# Patient Record
Sex: FEMALE | Race: WHITE | NOT HISPANIC OR LATINO | Employment: PART TIME | URBAN - NONMETROPOLITAN AREA
[De-identification: names, ages, dates, MRNs, and addresses within clinical notes are randomized per-mention and may not be internally consistent; named-entity substitution may affect disease eponyms.]

---
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
## 2021-04-15 ENCOUNTER — HOSPITAL ENCOUNTER (EMERGENCY)
Facility: HOSPITAL | Age: 21
Discharge: HOME | End: 2021-04-15
Attending: EMERGENCY MEDICINE | Admitting: EMERGENCY MEDICINE

## 2021-04-15 VITALS
HEIGHT: 61 IN | BODY MASS INDEX: 28.32 KG/M2 | DIASTOLIC BLOOD PRESSURE: 84 MMHG | TEMPERATURE: 97.6 F | WEIGHT: 150 LBS | RESPIRATION RATE: 18 BRPM | HEART RATE: 80 BPM | SYSTOLIC BLOOD PRESSURE: 119 MMHG | OXYGEN SATURATION: 98 %

## 2021-04-15 DIAGNOSIS — R00.2 PALPITATIONS: Primary | ICD-10-CM

## 2021-04-15 LAB
BASE EXCESS BLDA CALC-SCNC: 1 MMOL/L (ref -5–5)
CA-I BLDA-SCNC: 1.31 MMOL/L (ref 1.2–1.32)
CO2 BLDA-SCNC: 27 MMOL/L (ref 24–29)
GLUCOSE BLDC GLUCOMTR-MCNC: 85 MG/DL (ref 70–130)
HCO3 BLDA-SCNC: 26.2 MMOL/L (ref 22–26)
HCT VFR BLDA CALC: 39 % (ref 38–51)
HGB BLDA-MCNC: 13.3 G/DL (ref 12–17)
PCO2 BLDA: 42.2 MM HG (ref 35–45)
PH BLDA: 7.4 PH UNITS (ref 7.35–7.6)
PO2 BLDA: 38 MMHG (ref 80–105)
POTASSIUM BLDA-SCNC: 3.7 MMOL/L (ref 3.5–4.9)
SAO2 % BLDA: 73 % (ref 95–98)
SODIUM BLD-SCNC: 140 MMOL/L (ref 138–146)

## 2021-04-15 PROCEDURE — 82947 ASSAY GLUCOSE BLOOD QUANT: CPT

## 2021-04-15 PROCEDURE — 84132 ASSAY OF SERUM POTASSIUM: CPT

## 2021-04-15 PROCEDURE — 82803 BLOOD GASES ANY COMBINATION: CPT

## 2021-04-15 PROCEDURE — 84295 ASSAY OF SERUM SODIUM: CPT

## 2021-04-15 PROCEDURE — 93005 ELECTROCARDIOGRAM TRACING: CPT | Performed by: EMERGENCY MEDICINE

## 2021-04-15 PROCEDURE — 82330 ASSAY OF CALCIUM: CPT

## 2021-04-15 PROCEDURE — 85014 HEMATOCRIT: CPT

## 2021-04-15 PROCEDURE — 99284 EMERGENCY DEPT VISIT MOD MDM: CPT

## 2021-04-16 NOTE — ED PROVIDER NOTES
EMERGENCY DEPARTMENT ENCOUNTER      Pt Name: Kiersten Mathews  MRN: [id number]  YOB: 2000  Date of evaluation: 4/15/2021  Provider: Jimmy Trejo MD    CHIEF COMPLAINT       Chief Complaint   Patient presents with   • abnormal ekg         HISTORY OF PRESENT ILLNESS  (Location/Symptom, Timing/Onset, Context/Setting, Quality, Duration, Modifying Factors, Severity.)   Kiersten Mathews is a 20 y.o. female who presents to the emergency department for intermittent palpitations for the past 2 days.  Patient states that she has the symptom as if her heart skips a beat that is moderate in severity and without any associated triggering or modifying factors.  She denies any chest pain, shortness of breath, diaphoresis, or significant family history of cardiac disease in association with this.  She was evaluated at an urgent treatment center prior to arrival to the emergency department and was told that her ECG was abnormal and advised to report to the ED.  She denies any chronic medical issues as well as any history of substance abuse.      Nursing notes were reviewed.    REVIEW OF SYSTEMS    (2-9 systems for level 4, 10 or more for level 5)   ROS:  General:  No fevers, no chills, no weakness  Cardiovascular: Palpitations  Respiratory:  No shortness of breath, no cough, no wheezing  Gastrointestinal:  No pain, no nausea, no vomiting, no diarrhea  Musculoskeletal:  No muscle pain, no joint pain  Skin:  No rash, no easy bruising  Neurologic:  No speech problems, no headache, no extremity numbness, no extremity tingling, no extremity weakness  Psychiatric:  No anxiety  Genitourinary:  No dysuria, no hematuria    Except as noted above the remainder of the review of systems was reviewed and negative.       PAST MEDICAL HISTORY   History reviewed. No pertinent past medical history.      SURGICAL HISTORY     History reviewed. No pertinent surgical history.      CURRENT MEDICATIONS     None    ALLERGIES      Penicillins    FAMILY HISTORY       Family History   Problem Relation Age of Onset   • No Known Problems Mother    • No Known Problems Father           SOCIAL HISTORY       Social History     Socioeconomic History   • Marital status: Single     Spouse name: Not on file   • Number of children: Not on file   • Years of education: Not on file   • Highest education level: Not on file   Tobacco Use   • Smoking status: Never Smoker   Substance and Sexual Activity   • Alcohol use: No   • Drug use: No   • Sexual activity: Defer         PHYSICAL EXAM    (up to 7 for level 4, 8 or more for level 5)     Vitals:    04/15/21 2130 04/15/21 2145 04/15/21 2200 04/15/21 2216   BP: 124/83  119/84    BP Location:       Patient Position:       Pulse: 73 72 74 80   Resp:       Temp:       TempSrc:       SpO2: 98% 97% 96% 98%   Weight:       Height:           Physical Exam  General: Awake, alert, no acute distress.  HEENT: Conjunctiva normal.  Neck: Trachea midline.  Cardiac: Heart regular rate, rhythm, no murmurs, rubs, or gallops  Lungs: Lungs are clear to auscultation, there is no wheezing, rhonchi, or rales. There is no use of accessory muscles.  Chest wall: There is no tenderness to palpation over the chest wall or over ribs  Abdomen: Abdomen is soft, nontender, nondistended. There are no firm or pulsatile masses, no rebound rigidity or guarding.   Musculoskeletal: No deformity.  Neuro: Alert and oriented x 4.  Dermatology: Skin is warm and dry  Psych: Mentation is grossly normal, cognition is grossly normal. Affect is appropriate.        DIAGNOSTIC RESULTS     EKG: All EKG's are interpreted by the Emergency Department Physician who either signs or Co-signs this chart in the absence of a cardiologist.    Sinus arrhythmia rate of 60, no acute ST segment or T wave changes, normal intervals, no ectopy, no evidence of WPW/ARVC/HOCM/Brugada  LABS:    I have reviewed and interpreted all of the currently available lab results from this  visit (if applicable):  Results for orders placed or performed during the hospital encounter of 04/15/21   POC Surgery Labs    Specimen: Blood   Result Value Ref Range    Ionized Calcium 1.31 1.20 - 1.32 mmol/L    POC Potassium 3.7 3.5 - 4.9 mmol/L    Sodium 140 138 - 146 mmol/L    Total CO2 27 24 - 29 mmol/L    Hemoglobin 13.3 12.0 - 17.0 g/dL    Hematocrit 39 38 - 51 %    pCO2, Arterial 42.2 35 - 45 mm Hg    pO2, Arterial 38 (L) 80 - 105 mmHg    Base Excess 1.0000 -5 - 5 mmol/L    O2 Saturation, Arterial 73 (L) 95 - 98 %    pH, Arterial 7.40 7.35 - 7.6 pH units    HCO3, Arterial 26.2 (H) 22 - 26 mmol/L    Glucose 85 70 - 130 mg/dL   ECG 12 Lead   Result Value Ref Range    QT Interval 396 ms    QTC Interval 396 ms        All other labs were within normal range or not returned as of this dictation.      EMERGENCY DEPARTMENT COURSE and DIFFERENTIAL DIAGNOSIS/MDM:   Vitals:    Vitals:    04/15/21 2130 04/15/21 2145 04/15/21 2200 04/15/21 2216   BP: 124/83  119/84    BP Location:       Patient Position:       Pulse: 73 72 74 80   Resp:       Temp:       TempSrc:       SpO2: 98% 97% 96% 98%   Weight:       Height:                Patient very well-appearing and asymptomatic throughout the duration of her stay in the emergency department.  Her symptoms are most consistent with most likely PVCs, however there is no evidence of dysrhythmia on cardiac monitoring as well as on the ECG.  There is no evidence of ischemia or syncope syndrome.  Her laboratory evaluation demonstrates no evidence of electrolyte derangement or significant anemia.  I will place the patient in cardiac monitoring follow-up and also have her follow-up with her primary care provider.    I had a discussion with the patient/family regarding diagnosis, diagnostic results, treatment plan, and medications.  The patient/family indicated understanding of these instructions.  I spent adequate time at the bedside preceding discharge necessary to personally  discuss the aftercare instructions, giving patient education, providing explanations of the results of our evaluations/findings, and my decision making to assure that the patient/family understand the plan of care.  Time was allotted to answer questions at that time and throughout the ED course.  Emphasis was placed on timely follow-up after discharge.  I also discussed the potential for the development of an acute emergent condition requiring further evaluation, admission, or even surgical intervention. I discussed that we found nothing during the visit today indicating the need for further workup, admission, or the presence of an unstable medical condition.  I encouraged the patient to return to the emergency department immediately for ANY concerns, worsening, new complaints, or if symptoms persist and unable to seek follow-up in a timely fashion.  The patient/family expressed understanding and agreement with this plan.  The patient will follow-up with their PCP in 1-2 days for reevaluation.         FINAL IMPRESSION      1. Palpitations          DISPOSITION/PLAN     ED Disposition     ED Disposition Condition Comment    Discharge Stable           PATIENT REFERRED TO:  Porsche Santos, DINA  [address]  [facility] [address]  [phone]    Schedule an appointment as soon as possible for a visit in 2 days      [facility] Emergency Department  [address] [address] [phone]    If symptoms worsen    [facility] CARDIOLOGY  [address]  [facility] [address] [phone]  Schedule an appointment as soon as possible for a visit in 2 days        DISCHARGE MEDICATIONS:     Medication List      You have not been prescribed any medications.             Comment: Please note this report has been produced using speech recognition software.      Jimmy Trejo MD  Attending Emergency Physician               Neil,  Jimmy RICH MD  04/17/21 025

## 2021-04-17 LAB
QT INTERVAL: 396 MS
QTC INTERVAL: 396 MS

## 2021-04-22 ENCOUNTER — OFFICE VISIT (OUTPATIENT)
Dept: CARDIOLOGY | Facility: HOSPITAL | Age: 21
End: 2021-04-22

## 2021-04-22 ENCOUNTER — HOSPITAL ENCOUNTER (OUTPATIENT)
Dept: CARDIOLOGY | Facility: HOSPITAL | Age: 21
Discharge: HOME | End: 2021-04-22

## 2021-04-22 VITALS
DIASTOLIC BLOOD PRESSURE: 80 MMHG | HEIGHT: 61 IN | TEMPERATURE: 98 F | HEART RATE: 78 BPM | BODY MASS INDEX: 29.72 KG/M2 | SYSTOLIC BLOOD PRESSURE: 126 MMHG | WEIGHT: 157.44 LBS | RESPIRATION RATE: 18 BRPM | OXYGEN SATURATION: 98 %

## 2021-04-22 DIAGNOSIS — R00.2 PALPITATIONS: Primary | ICD-10-CM

## 2021-04-22 PROCEDURE — 93246 EXT ECG>7D<15D RECORDING: CPT

## 2021-04-22 PROCEDURE — 99203 OFFICE O/P NEW LOW 30 MIN: CPT | Performed by: NURSE PRACTITIONER

## 2021-04-22 NOTE — PROGRESS NOTES
"Chief Complaint  Establish Care and Palpitations    Subjective    History of Present Illness {CC  Problem List  Visit  Diagnosis   Encounters  Notes  Medications  Labs  Result Review Imaging  Media :23}     Kiersten Mathews presents to [facility] CARDIOLOGY for   MsNadia Mathews comes into the heart valve center at the request of the ER physician, Dr. Trejo.  She presented there on 4/15/2021 with a 2-day history of intermittent palpitations.  In urgent treatment center checked an EKG and advised her to go to ER.    Patient states palpitations have improved and nearly resolved since ER visit.  When palpitations began she noted her blood pressure was very high for couple of days.  It is trending downward now.  Currently her Apple Watch is keeping track of her heart rate numbers.  Resting heart rate has been as high as 100.  She continues to exercise regularly.    Patient states her palpitation and high blood pressure symptoms may have been related to situational stress.  She works as a .  Patient states she is a non-smoker.  She does not drink caffeine.  She exercises 3 to 5 days/week.  She does not use decongestants or recreational drugs.  She denies any recent work shift changes.    Family medical history: Father has hypertension maternal grandmother hypertension.  No CAD, no dysrhythmias, no sudden cardiac death family history           Objective     Vital Signs:   Vitals:    04/22/21 1110 04/22/21 1114 04/22/21 1115   BP: 132/88 125/85 126/80   BP Location: Right arm Left arm Left arm   Patient Position: Sitting Standing Sitting   Cuff Size: Adult Adult Adult   Pulse: 73 81 78   Resp:   18   Temp:   98 °F (36.7 °C)   TempSrc:   Temporal   SpO2: 100% 98% 98%   Weight:   71.4 kg (157 lb 7 oz)   Height:   154.9 cm (61\")     Body mass index is 29.75 kg/m².  Physical Exam  Vitals reviewed.   Constitutional:       General: She is not in acute distress.     Appearance: Normal " appearance.   HENT:      Head: Normocephalic and atraumatic.   Eyes:      Conjunctiva/sclera: Conjunctivae normal.      Pupils: Pupils are equal, round, and reactive to light.   Neck:      Vascular: No carotid bruit.   Cardiovascular:      Rate and Rhythm: Normal rate and regular rhythm.      Pulses: Normal pulses.      Heart sounds: No murmur heard.     Pulmonary:      Effort: Pulmonary effort is normal. No respiratory distress.      Breath sounds: Normal breath sounds.   Musculoskeletal:         General: Normal range of motion.      Cervical back: Neck supple.      Right lower leg: No edema.      Left lower leg: No edema.   Lymphadenopathy:      Cervical: No cervical adenopathy.   Skin:     General: Skin is warm and dry.      Capillary Refill: Capillary refill takes less than 2 seconds.   Neurological:      General: No focal deficit present.      Mental Status: She is oriented to person, place, and time.   Psychiatric:         Mood and Affect: Mood normal.         Behavior: Behavior normal.        Result Review  Data Reviewed:{ Labs  Result Review  Imaging  Med Tab  Media :23}     Recent hospitalization notes ER notes, labs, EKG            Assessment and Plan {CC Problem List  Visit Diagnosis  ROS  Review (Popup)  Health Maintenance  Quality  BestPractice  Medications  SmartSets  SnapShot Encounters  Media :23}   1. Palpitations  -Improving  - Holter Monitor -14 days days  - Follow-up in approximate 3 weeks  -Reported symptoms worsen or change in the interim      21 minutes    Follow Up {Instructions Charge Capture  Follow-up Communications :23}   Return in about 3 weeks (around 5/13/2021) for Monitor/ testing follow up.    Patient was given instructions and counseling regarding her condition or for health maintenance advice. Please see specific information pulled into the AVS if appropriate.  Patient was instructed to call the [facility] with any questions, concerns, or worsening  symptoms.    *Please note that portions of this note were completed with a voice recognition program. Efforts were made to edit the dictations, but occasionally words are mistranscribed.

## 2021-04-22 NOTE — PROGRESS NOTES
[facility] Heart Monitor Documentation    Kiersten Mathews  2000  [id number]  04/22/21    DINA Sims    [] ZIO XT Patch  Model [id number] Prescribed for N/A Days    · Serial Number: (N + 9 Digits) N   · Apply-By Date on Box:   · USPS Tracking Number:   · USPS Tracking        [] [employer]ian MINI PLUS Mobile Cardiac Telemetry  Model BGMINIPLUS Prescribed for N/A Days    · Serial Number: (BGM + 7 Digits) BGM  · Shipped-By Date on Box:   · UPS Tracking Number: 1Z  · UPS Tracking      [] [employer]ian MINI Holter Monitor  Model BGMINIEL Prescribed for 14 Days    · Serial Number: (7 Digits) [id number]  · Shipped-By Date on Box: [id number]  · UPS Tracking Number: [id number]  · UPS Tracking        This monitor was applied to the patient's chest and checked for proper functioning.  Ms. Kiersten Mahtews was instructed in the proper use of this monitor.  She was given the opportunity to ask questions and left the office with the device 's instruction manual.    Erika Whelan MA, 11:45 EDT, 04/22/21                  [facility] 8[facility]

## 2021-05-13 ENCOUNTER — OFFICE VISIT (OUTPATIENT)
Dept: CARDIOLOGY | Facility: HOSPITAL | Age: 21
End: 2021-05-13

## 2021-05-13 VITALS
SYSTOLIC BLOOD PRESSURE: 128 MMHG | HEIGHT: 61 IN | HEART RATE: 72 BPM | WEIGHT: 150 LBS | RESPIRATION RATE: 18 BRPM | OXYGEN SATURATION: 98 % | BODY MASS INDEX: 28.32 KG/M2 | TEMPERATURE: 98.4 F | DIASTOLIC BLOOD PRESSURE: 80 MMHG

## 2021-05-13 DIAGNOSIS — R07.2 PRECORDIAL PAIN: ICD-10-CM

## 2021-05-13 DIAGNOSIS — R00.2 PALPITATIONS: Primary | ICD-10-CM

## 2021-05-13 PROCEDURE — 99214 OFFICE O/P EST MOD 30 MIN: CPT | Performed by: NURSE PRACTITIONER

## 2021-05-13 NOTE — PROGRESS NOTES
"Chief Complaint  Follow-up (palpitations)    Subjective    History of Present Illness {CC  Problem List  Visit  Diagnosis   Encounters  Notes  Medications  Labs  Result Review Imaging  Media :23}     Kiersten Mathews presents to [facility] CARDIOLOGY for   Patient follows up to review cardiac rhythm monitor that was placed on 4/22/21.    Patient states her HR has not had the ups and downs like it did before the monitor was placed.  However, she has noticed substernal chest pain on three occasions while the monitor was in place.  Patient is s/p COVID illness in January.  She is concerned current cardiac symptoms are related to that process.       Objective     Vital Signs:   Vitals:    05/13/21 1006   BP: 128/80   BP Location: Left arm   Patient Position: Sitting   Cuff Size: Adult   Pulse: 72   Resp: 18   Temp: 98.4 °F (36.9 °C)   TempSrc: Temporal   SpO2: 98%   Weight: 68 kg (150 lb)   Height: 154.9 cm (61\")     Body mass index is 28.34 kg/m².  Physical Exam  Vitals reviewed.   Constitutional:       General: She is not in acute distress.     Appearance: Normal appearance.   HENT:      Head: Normocephalic and atraumatic.   Eyes:      Conjunctiva/sclera: Conjunctivae normal.      Pupils: Pupils are equal, round, and reactive to light.   Cardiovascular:      Rate and Rhythm: Normal rate and regular rhythm.      Pulses: Normal pulses.      Heart sounds: Normal heart sounds. No murmur heard.     Pulmonary:      Effort: Pulmonary effort is normal. No respiratory distress.      Breath sounds: Normal breath sounds. No wheezing or rales.   Chest:      Chest wall: No tenderness.   Musculoskeletal:         General: Normal range of motion.      Cervical back: Normal range of motion and neck supple.      Right lower leg: No edema.      Left lower leg: No edema.   Skin:     General: Skin is warm and dry.      Capillary Refill: Capillary refill takes less than 2 seconds.   Neurological:      General: No " focal deficit present.      Mental Status: She is alert and oriented to person, place, and time.   Psychiatric:         Mood and Affect: Mood normal.         Behavior: Behavior normal.              Result Review  Data Reviewed:{ Labs  Result Review  Imaging  Med Tab  Media :23}     Preliminary data from 14 day holter: benign.  Sinus arrhythmia, sinus tachycardia.  No afib, no SVT, no pauses.              Assessment and Plan {CC Problem List  Visit Diagnosis  ROS  Review (Popup)  Health Maintenance  Quality  BestPractice  Medications  SmartSets  SnapShot Encounters  Media :23}   1. Palpitations  - Benign monitor study    2. Precordial pain  - No CAD risk factors  - s/p COVID 19 infection January 2021  - Treadmill Stress Test; Future  - Adult Transthoracic Echo Complete W/ Cont if Necessary Per Protocol; Future      29 minutes    Follow Up {Instructions Charge Capture  Follow-up Communications :23}   Return in about 3 weeks (around 6/3/2021) for Video visit, Monitor/ testing follow up.    Patient was given instructions and counseling regarding her condition or for health maintenance advice. Please see specific information pulled into the AVS if appropriate.  Patient was instructed to call the [facility] with any questions, concerns, or worsening symptoms.

## 2021-05-25 PROCEDURE — 93248 EXT ECG>7D<15D REV&INTERPJ: CPT | Performed by: INTERNAL MEDICINE

## 2021-06-30 ENCOUNTER — HOSPITAL ENCOUNTER (OUTPATIENT)
Dept: CARDIOLOGY | Facility: HOSPITAL | Age: 21
Discharge: HOME | End: 2021-06-30

## 2021-06-30 VITALS — HEIGHT: 61 IN | WEIGHT: 150 LBS | BODY MASS INDEX: 28.32 KG/M2

## 2021-06-30 VITALS
BODY MASS INDEX: 28.32 KG/M2 | SYSTOLIC BLOOD PRESSURE: 120 MMHG | HEIGHT: 61 IN | WEIGHT: 150 LBS | DIASTOLIC BLOOD PRESSURE: 90 MMHG | HEART RATE: 78 BPM

## 2021-06-30 DIAGNOSIS — R00.2 PALPITATIONS: ICD-10-CM

## 2021-06-30 DIAGNOSIS — R07.2 PRECORDIAL PAIN: ICD-10-CM

## 2021-06-30 LAB
BH CV ECHO MEAS - AO ROOT AREA (BSA CORRECTED): 1.8
BH CV ECHO MEAS - AO ROOT AREA: 7 CM^2
BH CV ECHO MEAS - AO ROOT DIAM: 3 CM
BH CV ECHO MEAS - ASC AORTA: 2.4 CM
BH CV ECHO MEAS - BSA(HAYCOCK): 1.7 M^2
BH CV ECHO MEAS - BSA: 1.7 M^2
BH CV ECHO MEAS - BZI_BMI: 28.7 KILOGRAMS/M^2
BH CV ECHO MEAS - BZI_METRIC_HEIGHT: 154 CM
BH CV ECHO MEAS - BZI_METRIC_WEIGHT: 68 KG
BH CV ECHO MEAS - EDV(CUBED): 104.5 ML
BH CV ECHO MEAS - EDV(MOD-SP2): 125 ML
BH CV ECHO MEAS - EDV(MOD-SP4): 124 ML
BH CV ECHO MEAS - EDV(TEICH): 102.9 ML
BH CV ECHO MEAS - EF(CUBED): 75.4 %
BH CV ECHO MEAS - EF(MOD-BP): 55 %
BH CV ECHO MEAS - EF(MOD-SP2): 59 %
BH CV ECHO MEAS - EF(MOD-SP4): 51.2 %
BH CV ECHO MEAS - EF(TEICH): 67.3 %
BH CV ECHO MEAS - ESV(CUBED): 25.7 ML
BH CV ECHO MEAS - ESV(MOD-SP2): 51.2 ML
BH CV ECHO MEAS - ESV(MOD-SP4): 60.5 ML
BH CV ECHO MEAS - ESV(TEICH): 33.6 ML
BH CV ECHO MEAS - FS: 37.4 %
BH CV ECHO MEAS - IVS/LVPW: 0.82
BH CV ECHO MEAS - IVSD: 0.88 CM
BH CV ECHO MEAS - LA DIMENSION: 2.9 CM
BH CV ECHO MEAS - LA/AO: 0.98
BH CV ECHO MEAS - LAD MAJOR: 3.7 CM
BH CV ECHO MEAS - LAT PEAK E' VEL: 15.3 CM/SEC
BH CV ECHO MEAS - LATERAL E/E' RATIO: 4.3
BH CV ECHO MEAS - LV DIASTOLIC VOL/BSA (35-75): 74.5 ML/M^2
BH CV ECHO MEAS - LV MASS(C)D: 158.5 GRAMS
BH CV ECHO MEAS - LV MASS(C)DI: 95.2 GRAMS/M^2
BH CV ECHO MEAS - LV MAX PG: 3.8 MMHG
BH CV ECHO MEAS - LV MEAN PG: 2 MMHG
BH CV ECHO MEAS - LV SYSTOLIC VOL/BSA (12-30): 36.4 ML/M^2
BH CV ECHO MEAS - LV V1 MAX: 97.3 CM/SEC
BH CV ECHO MEAS - LV V1 MEAN: 65.6 CM/SEC
BH CV ECHO MEAS - LV V1 VTI: 21.3 CM
BH CV ECHO MEAS - LVIDD: 4.7 CM
BH CV ECHO MEAS - LVIDS: 2.9 CM
BH CV ECHO MEAS - LVLD AP2: 7.9 CM
BH CV ECHO MEAS - LVLD AP4: 8 CM
BH CV ECHO MEAS - LVLS AP2: 7 CM
BH CV ECHO MEAS - LVLS AP4: 6.9 CM
BH CV ECHO MEAS - LVOT AREA (M): 2.5 CM^2
BH CV ECHO MEAS - LVOT AREA: 2.5 CM^2
BH CV ECHO MEAS - LVOT DIAM: 1.8 CM
BH CV ECHO MEAS - LVPWD: 1.1 CM
BH CV ECHO MEAS - MED PEAK E' VEL: 10 CM/SEC
BH CV ECHO MEAS - MEDIAL E/E' RATIO: 6.6
BH CV ECHO MEAS - MV A MAX VEL: 45.4 CM/SEC
BH CV ECHO MEAS - MV DEC SLOPE: 356.8 CM/SEC^2
BH CV ECHO MEAS - MV DEC TIME: 0.14 SEC
BH CV ECHO MEAS - MV E MAX VEL: 66.4 CM/SEC
BH CV ECHO MEAS - MV E/A: 1.5
BH CV ECHO MEAS - MV MAX PG: 2.2 MMHG
BH CV ECHO MEAS - MV MEAN PG: 1.1 MMHG
BH CV ECHO MEAS - MV P1/2T MAX VEL: 71.7 CM/SEC
BH CV ECHO MEAS - MV P1/2T: 58.8 MSEC
BH CV ECHO MEAS - MV V2 MAX: 73.8 CM/SEC
BH CV ECHO MEAS - MV V2 MEAN: 50.1 CM/SEC
BH CV ECHO MEAS - MV V2 VTI: 20.1 CM
BH CV ECHO MEAS - MVA P1/2T LCG: 3.1 CM^2
BH CV ECHO MEAS - MVA(P1/2T): 3.7 CM^2
BH CV ECHO MEAS - MVA(VTI): 2.6 CM^2
BH CV ECHO MEAS - PA ACC TIME: 0.07 SEC
BH CV ECHO MEAS - PA PR(ACCEL): 45.7 MMHG
BH CV ECHO MEAS - SI(CUBED): 47.3 ML/M^2
BH CV ECHO MEAS - SI(LVOT): 31.3 ML/M^2
BH CV ECHO MEAS - SI(MOD-SP2): 44.3 ML/M^2
BH CV ECHO MEAS - SI(MOD-SP4): 38.2 ML/M^2
BH CV ECHO MEAS - SI(TEICH): 41.6 ML/M^2
BH CV ECHO MEAS - SV(CUBED): 78.8 ML
BH CV ECHO MEAS - SV(LVOT): 52.1 ML
BH CV ECHO MEAS - SV(MOD-SP2): 73.8 ML
BH CV ECHO MEAS - SV(MOD-SP4): 63.5 ML
BH CV ECHO MEAS - SV(TEICH): 69.3 ML
BH CV ECHO MEAS - TAPSE (>1.6): 1.7 CM
BH CV ECHO MEASUREMENTS AVERAGE E/E' RATIO: 5.25
BH CV STRESS BP STAGE 1: NORMAL
BH CV STRESS BP STAGE 2: NORMAL
BH CV STRESS BP STAGE 3: NORMAL
BH CV STRESS BP STAGE 4: NORMAL
BH CV STRESS DURATION MIN STAGE 1: 3
BH CV STRESS DURATION MIN STAGE 2: 3
BH CV STRESS DURATION MIN STAGE 3: 3
BH CV STRESS DURATION MIN STAGE 4: 2
BH CV STRESS DURATION SEC STAGE 1: 0
BH CV STRESS DURATION SEC STAGE 2: 0
BH CV STRESS DURATION SEC STAGE 3: 0
BH CV STRESS DURATION SEC STAGE 4: 45
BH CV STRESS GRADE STAGE 1: 10
BH CV STRESS GRADE STAGE 2: 12
BH CV STRESS GRADE STAGE 3: 14
BH CV STRESS GRADE STAGE 4: 16
BH CV STRESS HR STAGE 1: 113
BH CV STRESS HR STAGE 2: 133
BH CV STRESS HR STAGE 3: 150
BH CV STRESS HR STAGE 4: 179
BH CV STRESS METS STAGE 1: 5
BH CV STRESS METS STAGE 2: 7.5
BH CV STRESS METS STAGE 3: 10
BH CV STRESS METS STAGE 4: 13.5
BH CV STRESS O2 STAGE 1: 97
BH CV STRESS O2 STAGE 2: 97
BH CV STRESS O2 STAGE 3: 98
BH CV STRESS O2 STAGE 4: 99
BH CV STRESS PROTOCOL 1: NORMAL
BH CV STRESS RECOVERY BP: NORMAL MMHG
BH CV STRESS RECOVERY HR: 102 BPM
BH CV STRESS RECOVERY O2: 97 %
BH CV STRESS SPEED STAGE 1: 1.7
BH CV STRESS SPEED STAGE 2: 2.5
BH CV STRESS SPEED STAGE 3: 3.4
BH CV STRESS SPEED STAGE 4: 4.2
BH CV STRESS STAGE 1: 1
BH CV STRESS STAGE 2: 2
BH CV STRESS STAGE 3: 3
BH CV STRESS STAGE 4: 4
BH CV VAS BP LEFT ARM: NORMAL MMHG
BH CV XLRA - RV BASE: 3.9 CM
BH CV XLRA - RV LENGTH: 4.8 CM
BH CV XLRA - RV MID: 2.8 CM
BH CV XLRA - TDI S': 12 CM/SEC
LEFT ATRIUM VOLUME INDEX: 15.7 ML/M^2
LEFT ATRIUM VOLUME: 26.1 ML
LV EF 2D ECHO EST: 55 %
MAXIMAL PREDICTED HEART RATE: 198 BPM
MAXIMAL PREDICTED HEART RATE: 198 BPM
PERCENT MAX PREDICTED HR: 90.4 %
STRESS BASELINE BP: NORMAL MMHG
STRESS BASELINE HR: 78 BPM
STRESS O2 SAT REST: 97 %
STRESS PERCENT HR: 106 %
STRESS POST ESTIMATED WORKLOAD: 13.4 METS
STRESS POST EXERCISE DUR MIN: 11 MIN
STRESS POST EXERCISE DUR SEC: 45 SEC
STRESS POST O2 SAT PEAK: 99 %
STRESS POST PEAK BP: NORMAL MMHG
STRESS POST PEAK HR: 179 BPM
STRESS TARGET HR: 168 BPM
STRESS TARGET HR: 168 BPM

## 2021-06-30 PROCEDURE — 93306 TTE W/DOPPLER COMPLETE: CPT

## 2021-06-30 PROCEDURE — 93306 TTE W/DOPPLER COMPLETE: CPT | Performed by: INTERNAL MEDICINE

## 2021-06-30 PROCEDURE — 93018 CV STRESS TEST I&R ONLY: CPT | Performed by: INTERNAL MEDICINE

## 2021-06-30 PROCEDURE — 93017 CV STRESS TEST TRACING ONLY: CPT

## 2022-04-11 ENCOUNTER — HOSPITAL ENCOUNTER (EMERGENCY)
Facility: HOSPITAL | Age: 22
Discharge: HOME | End: 2022-04-12
Attending: STUDENT IN AN ORGANIZED HEALTH CARE EDUCATION/TRAINING PROGRAM | Admitting: STUDENT IN AN ORGANIZED HEALTH CARE EDUCATION/TRAINING PROGRAM

## 2022-04-11 ENCOUNTER — APPOINTMENT (OUTPATIENT)
Dept: CT IMAGING | Facility: HOSPITAL | Age: 22
End: 2022-04-11

## 2022-04-11 DIAGNOSIS — M62.830 SPASM OF MUSCLE, BACK: ICD-10-CM

## 2022-04-11 DIAGNOSIS — N39.0 ACUTE UTI (URINARY TRACT INFECTION): Primary | ICD-10-CM

## 2022-04-11 LAB
ALBUMIN SERPL-MCNC: 4.2 G/DL (ref 3.5–5.2)
ALBUMIN/GLOB SERPL: 1.3 G/DL
ALP SERPL-CCNC: 79 U/L (ref 39–117)
ALT SERPL W P-5'-P-CCNC: 9 U/L (ref 1–33)
ANION GAP SERPL CALCULATED.3IONS-SCNC: 10 MMOL/L (ref 5–15)
AST SERPL-CCNC: 16 U/L (ref 1–32)
BACTERIA UR QL AUTO: ABNORMAL /HPF
BASOPHILS # BLD AUTO: 0.03 10*3/MM3 (ref 0–0.2)
BASOPHILS NFR BLD AUTO: 0.3 % (ref 0–1.5)
BILIRUB SERPL-MCNC: 0.2 MG/DL (ref 0–1.2)
BILIRUB UR QL STRIP: NEGATIVE
BUN SERPL-MCNC: 25 MG/DL (ref 6–20)
BUN/CREAT SERPL: 28.7 (ref 7–25)
CALCIUM SPEC-SCNC: 9.5 MG/DL (ref 8.6–10.5)
CHLORIDE SERPL-SCNC: 103 MMOL/L (ref 98–107)
CLARITY UR: ABNORMAL
CO2 SERPL-SCNC: 26 MMOL/L (ref 22–29)
COLOR UR: YELLOW
CREAT SERPL-MCNC: 0.87 MG/DL (ref 0.57–1)
DEPRECATED RDW RBC AUTO: 45.1 FL (ref 37–54)
EGFRCR SERPLBLD CKD-EPI 2021: 96.7 ML/MIN/1.73
EOSINOPHIL # BLD AUTO: 0.26 10*3/MM3 (ref 0–0.4)
EOSINOPHIL NFR BLD AUTO: 2.5 % (ref 0.3–6.2)
ERYTHROCYTE [DISTWIDTH] IN BLOOD BY AUTOMATED COUNT: 13.2 % (ref 12.3–15.4)
GLOBULIN UR ELPH-MCNC: 3.3 GM/DL
GLUCOSE SERPL-MCNC: 85 MG/DL (ref 65–99)
GLUCOSE UR STRIP-MCNC: NEGATIVE MG/DL
HCT VFR BLD AUTO: 35.9 % (ref 34–46.6)
HGB BLD-MCNC: 11.9 G/DL (ref 12–15.9)
HGB UR QL STRIP.AUTO: NEGATIVE
HYALINE CASTS UR QL AUTO: ABNORMAL /LPF
IMM GRANULOCYTES # BLD AUTO: 0.02 10*3/MM3 (ref 0–0.05)
IMM GRANULOCYTES NFR BLD AUTO: 0.2 % (ref 0–0.5)
KETONES UR QL STRIP: ABNORMAL
LEUKOCYTE ESTERASE UR QL STRIP.AUTO: NEGATIVE
LIPASE SERPL-CCNC: 29 U/L (ref 13–60)
LYMPHOCYTES # BLD AUTO: 2.99 10*3/MM3 (ref 0.7–3.1)
LYMPHOCYTES NFR BLD AUTO: 28.8 % (ref 19.6–45.3)
MCH RBC QN AUTO: 30.7 PG (ref 26.6–33)
MCHC RBC AUTO-ENTMCNC: 33.1 G/DL (ref 31.5–35.7)
MCV RBC AUTO: 92.8 FL (ref 79–97)
MONOCYTES # BLD AUTO: 0.84 10*3/MM3 (ref 0.1–0.9)
MONOCYTES NFR BLD AUTO: 8.1 % (ref 5–12)
NEUTROPHILS NFR BLD AUTO: 6.23 10*3/MM3 (ref 1.7–7)
NEUTROPHILS NFR BLD AUTO: 60.1 % (ref 42.7–76)
NITRITE UR QL STRIP: NEGATIVE
NRBC BLD AUTO-RTO: 0 /100 WBC (ref 0–0.2)
PH UR STRIP.AUTO: 5.5 [PH] (ref 5–8)
PLATELET # BLD AUTO: 313 10*3/MM3 (ref 140–450)
PMV BLD AUTO: 10.5 FL (ref 6–12)
POTASSIUM SERPL-SCNC: 4 MMOL/L (ref 3.5–5.2)
PROT SERPL-MCNC: 7.5 G/DL (ref 6–8.5)
PROT UR QL STRIP: NEGATIVE
RBC # BLD AUTO: 3.87 10*6/MM3 (ref 3.77–5.28)
RBC # UR STRIP: ABNORMAL /HPF
REF LAB TEST METHOD: ABNORMAL
SODIUM SERPL-SCNC: 139 MMOL/L (ref 136–145)
SP GR UR STRIP: 1.04 (ref 1–1.03)
SQUAMOUS #/AREA URNS HPF: ABNORMAL /HPF
UROBILINOGEN UR QL STRIP: ABNORMAL
WBC # UR STRIP: ABNORMAL /HPF
WBC NRBC COR # BLD: 10.37 10*3/MM3 (ref 3.4–10.8)

## 2022-04-11 PROCEDURE — 81001 URINALYSIS AUTO W/SCOPE: CPT | Performed by: STUDENT IN AN ORGANIZED HEALTH CARE EDUCATION/TRAINING PROGRAM

## 2022-04-11 PROCEDURE — 93005 ELECTROCARDIOGRAM TRACING: CPT

## 2022-04-11 PROCEDURE — 71250 CT THORAX DX C-: CPT

## 2022-04-11 PROCEDURE — 99283 EMERGENCY DEPT VISIT LOW MDM: CPT

## 2022-04-11 PROCEDURE — 80053 COMPREHEN METABOLIC PANEL: CPT | Performed by: PHYSICIAN ASSISTANT

## 2022-04-11 PROCEDURE — 93005 ELECTROCARDIOGRAM TRACING: CPT | Performed by: STUDENT IN AN ORGANIZED HEALTH CARE EDUCATION/TRAINING PROGRAM

## 2022-04-11 PROCEDURE — 83690 ASSAY OF LIPASE: CPT | Performed by: PHYSICIAN ASSISTANT

## 2022-04-11 PROCEDURE — 85025 COMPLETE CBC W/AUTO DIFF WBC: CPT | Performed by: PHYSICIAN ASSISTANT

## 2022-04-11 RX ORDER — DIAZEPAM 5 MG/ML
5 INJECTION, SOLUTION INTRAMUSCULAR; INTRAVENOUS ONCE
Status: COMPLETED | OUTPATIENT
Start: 2022-04-11 | End: 2022-04-12

## 2022-04-12 VITALS
TEMPERATURE: 98 F | RESPIRATION RATE: 18 BRPM | WEIGHT: 150 LBS | HEART RATE: 81 BPM | BODY MASS INDEX: 28.32 KG/M2 | DIASTOLIC BLOOD PRESSURE: 86 MMHG | OXYGEN SATURATION: 97 % | SYSTOLIC BLOOD PRESSURE: 129 MMHG | HEIGHT: 61 IN

## 2022-04-12 LAB
QT INTERVAL: 366 MS
QTC INTERVAL: 419 MS

## 2022-04-12 PROCEDURE — 96374 THER/PROPH/DIAG INJ IV PUSH: CPT

## 2022-04-12 PROCEDURE — 25010000002 DIAZEPAM PER 5 MG: Performed by: STUDENT IN AN ORGANIZED HEALTH CARE EDUCATION/TRAINING PROGRAM

## 2022-04-12 RX ORDER — TIZANIDINE 2 MG/1
2 TABLET ORAL EVERY 6 HOURS PRN
Qty: 12 TABLET | Refills: 0 | Status: SHIPPED | OUTPATIENT
Start: 2022-04-12 | End: 2022-04-15

## 2022-04-12 RX ORDER — NITROFURANTOIN 25; 75 MG/1; MG/1
100 CAPSULE ORAL 2 TIMES DAILY
Qty: 10 CAPSULE | Refills: 0 | Status: SHIPPED | OUTPATIENT
Start: 2022-04-12 | End: 2022-04-14 | Stop reason: HOSPADM

## 2022-04-12 RX ADMIN — DIAZEPAM 5 MG: 5 INJECTION, SOLUTION INTRAMUSCULAR; INTRAVENOUS at 00:13

## 2022-04-13 ENCOUNTER — HOSPITAL ENCOUNTER (OUTPATIENT)
Facility: HOSPITAL | Age: 22
Discharge: HOME | End: 2022-04-14
Attending: STUDENT IN AN ORGANIZED HEALTH CARE EDUCATION/TRAINING PROGRAM | Admitting: NEUROLOGICAL SURGERY

## 2022-04-13 ENCOUNTER — APPOINTMENT (OUTPATIENT)
Dept: MRI IMAGING | Facility: HOSPITAL | Age: 22
End: 2022-04-13

## 2022-04-13 ENCOUNTER — APPOINTMENT (OUTPATIENT)
Dept: GENERAL RADIOLOGY | Facility: HOSPITAL | Age: 22
End: 2022-04-13

## 2022-04-13 ENCOUNTER — ANESTHESIA (OUTPATIENT)
Dept: PERIOP | Facility: HOSPITAL | Age: 22
End: 2022-04-13

## 2022-04-13 ENCOUNTER — ANESTHESIA EVENT (OUTPATIENT)
Dept: PERIOP | Facility: HOSPITAL | Age: 22
End: 2022-04-13

## 2022-04-13 DIAGNOSIS — G83.11 PARALYSIS OF RIGHT LOWER EXTREMITY: ICD-10-CM

## 2022-04-13 DIAGNOSIS — G83.14 PARALYSIS OF LEFT LOWER EXTREMITY: ICD-10-CM

## 2022-04-13 DIAGNOSIS — R07.2 PRECORDIAL PAIN: ICD-10-CM

## 2022-04-13 DIAGNOSIS — M54.50 ACUTE LEFT-SIDED LOW BACK PAIN, UNSPECIFIED WHETHER SCIATICA PRESENT: Primary | ICD-10-CM

## 2022-04-13 DIAGNOSIS — M54.6 ACUTE RIGHT-SIDED THORACIC BACK PAIN: ICD-10-CM

## 2022-04-13 PROBLEM — D72.829 LEUKOCYTOSIS: Status: ACTIVE | Noted: 2022-04-13

## 2022-04-13 PROBLEM — E03.9 HYPOTHYROIDISM (ACQUIRED): Status: ACTIVE | Noted: 2022-04-13

## 2022-04-13 PROBLEM — I10 ESSENTIAL HYPERTENSION: Status: ACTIVE | Noted: 2022-04-13

## 2022-04-13 LAB
ALBUMIN SERPL-MCNC: 4.4 G/DL (ref 3.5–5.2)
ALBUMIN/GLOB SERPL: 1.3 G/DL
ALP SERPL-CCNC: 74 U/L (ref 39–117)
ALT SERPL W P-5'-P-CCNC: 9 U/L (ref 1–33)
ANION GAP SERPL CALCULATED.3IONS-SCNC: 13 MMOL/L (ref 5–15)
AST SERPL-CCNC: 17 U/L (ref 1–32)
B-HCG UR QL: NEGATIVE
BASOPHILS # BLD AUTO: 0.03 10*3/MM3 (ref 0–0.2)
BASOPHILS NFR BLD AUTO: 0.2 % (ref 0–1.5)
BILIRUB SERPL-MCNC: 0.4 MG/DL (ref 0–1.2)
BILIRUB UR QL STRIP: NEGATIVE
BUN SERPL-MCNC: 15 MG/DL (ref 6–20)
BUN/CREAT SERPL: 20 (ref 7–25)
CALCIUM SPEC-SCNC: 9.1 MG/DL (ref 8.6–10.5)
CHLORIDE SERPL-SCNC: 100 MMOL/L (ref 98–107)
CLARITY UR: CLEAR
CO2 SERPL-SCNC: 24 MMOL/L (ref 22–29)
COLOR UR: YELLOW
CREAT SERPL-MCNC: 0.75 MG/DL (ref 0.57–1)
CRP SERPL-MCNC: 6.21 MG/DL (ref 0–0.5)
D-LACTATE SERPL-SCNC: 0.8 MMOL/L (ref 0.5–2)
DEPRECATED RDW RBC AUTO: 44.5 FL (ref 37–54)
EGFRCR SERPLBLD CKD-EPI 2021: 115.6 ML/MIN/1.73
EOSINOPHIL # BLD AUTO: 0.09 10*3/MM3 (ref 0–0.4)
EOSINOPHIL NFR BLD AUTO: 0.7 % (ref 0.3–6.2)
ERYTHROCYTE [DISTWIDTH] IN BLOOD BY AUTOMATED COUNT: 13.2 % (ref 12.3–15.4)
ERYTHROCYTE [SEDIMENTATION RATE] IN BLOOD: 38 MM/HR (ref 0–20)
EXPIRATION DATE: NORMAL
GLOBULIN UR ELPH-MCNC: 3.3 GM/DL
GLUCOSE SERPL-MCNC: 104 MG/DL (ref 65–99)
GLUCOSE UR STRIP-MCNC: NEGATIVE MG/DL
HCT VFR BLD AUTO: 37.4 % (ref 34–46.6)
HGB BLD-MCNC: 12.6 G/DL (ref 12–15.9)
HGB UR QL STRIP.AUTO: NEGATIVE
HOLD SPECIMEN: NORMAL
IMM GRANULOCYTES # BLD AUTO: 0.05 10*3/MM3 (ref 0–0.05)
IMM GRANULOCYTES NFR BLD AUTO: 0.4 % (ref 0–0.5)
INTERNAL NEGATIVE CONTROL: NEGATIVE
INTERNAL POSITIVE CONTROL: POSITIVE
KETONES UR QL STRIP: ABNORMAL
LEUKOCYTE ESTERASE UR QL STRIP.AUTO: NEGATIVE
LIPASE SERPL-CCNC: 25 U/L (ref 13–60)
LYMPHOCYTES # BLD AUTO: 1.86 10*3/MM3 (ref 0.7–3.1)
LYMPHOCYTES NFR BLD AUTO: 14.3 % (ref 19.6–45.3)
Lab: NORMAL
MAGNESIUM SERPL-MCNC: 1.8 MG/DL (ref 1.6–2.6)
MCH RBC QN AUTO: 31.2 PG (ref 26.6–33)
MCHC RBC AUTO-ENTMCNC: 33.7 G/DL (ref 31.5–35.7)
MCV RBC AUTO: 92.6 FL (ref 79–97)
MONOCYTES # BLD AUTO: 1.08 10*3/MM3 (ref 0.1–0.9)
MONOCYTES NFR BLD AUTO: 8.3 % (ref 5–12)
NEUTROPHILS NFR BLD AUTO: 76.1 % (ref 42.7–76)
NEUTROPHILS NFR BLD AUTO: 9.93 10*3/MM3 (ref 1.7–7)
NITRITE UR QL STRIP: NEGATIVE
NRBC BLD AUTO-RTO: 0 /100 WBC (ref 0–0.2)
PH UR STRIP.AUTO: 5.5 [PH] (ref 5–8)
PHOSPHATE SERPL-MCNC: 2.6 MG/DL (ref 2.5–4.5)
PLATELET # BLD AUTO: 332 10*3/MM3 (ref 140–450)
PMV BLD AUTO: 10.8 FL (ref 6–12)
POTASSIUM SERPL-SCNC: 4 MMOL/L (ref 3.5–5.2)
PROCALCITONIN SERPL-MCNC: 0.05 NG/ML (ref 0–0.25)
PROT SERPL-MCNC: 7.7 G/DL (ref 6–8.5)
PROT UR QL STRIP: NEGATIVE
RBC # BLD AUTO: 4.04 10*6/MM3 (ref 3.77–5.28)
SARS-COV-2 RDRP RESP QL NAA+PROBE: NORMAL
SODIUM SERPL-SCNC: 137 MMOL/L (ref 136–145)
SP GR UR STRIP: 1.02 (ref 1–1.03)
T4 FREE SERPL-MCNC: 1.05 NG/DL (ref 0.93–1.7)
TSH SERPL DL<=0.05 MIU/L-ACNC: 29.64 UIU/ML (ref 0.27–4.2)
UROBILINOGEN UR QL STRIP: ABNORMAL
WBC NRBC COR # BLD: 13.04 10*3/MM3 (ref 3.4–10.8)
WHOLE BLOOD HOLD SPECIMEN: NORMAL
WHOLE BLOOD HOLD SPECIMEN: NORMAL

## 2022-04-13 PROCEDURE — 83605 ASSAY OF LACTIC ACID: CPT | Performed by: STUDENT IN AN ORGANIZED HEALTH CARE EDUCATION/TRAINING PROGRAM

## 2022-04-13 PROCEDURE — 22845 INSERT SPINE FIXATION DEVICE: CPT | Performed by: NEUROLOGICAL SURGERY

## 2022-04-13 PROCEDURE — C1713 ANCHOR/SCREW BN/BN,TIS/BN: HCPCS | Performed by: NEUROLOGICAL SURGERY

## 2022-04-13 PROCEDURE — 72141 MRI NECK SPINE W/O DYE: CPT

## 2022-04-13 PROCEDURE — 25010000002 DEXAMETHASONE PER 1 MG: Performed by: NURSE ANESTHETIST, CERTIFIED REGISTERED

## 2022-04-13 PROCEDURE — 25010000002 PROPOFOL 10 MG/ML EMULSION: Performed by: NURSE ANESTHETIST, CERTIFIED REGISTERED

## 2022-04-13 PROCEDURE — 84145 PROCALCITONIN (PCT): CPT | Performed by: STUDENT IN AN ORGANIZED HEALTH CARE EDUCATION/TRAINING PROGRAM

## 2022-04-13 PROCEDURE — 99222 1ST HOSP IP/OBS MODERATE 55: CPT | Performed by: NEUROLOGICAL SURGERY

## 2022-04-13 PROCEDURE — 83690 ASSAY OF LIPASE: CPT | Performed by: STUDENT IN AN ORGANIZED HEALTH CARE EDUCATION/TRAINING PROGRAM

## 2022-04-13 PROCEDURE — 25010000002 ONDANSETRON PER 1 MG: Performed by: ANESTHESIOLOGY

## 2022-04-13 PROCEDURE — 22845 INSERT SPINE FIXATION DEVICE: CPT | Performed by: PHYSICIAN ASSISTANT

## 2022-04-13 PROCEDURE — 87635 SARS-COV-2 COVID-19 AMP PRB: CPT | Performed by: INTERNAL MEDICINE

## 2022-04-13 PROCEDURE — C1889 IMPLANT/INSERT DEVICE, NOC: HCPCS | Performed by: NEUROLOGICAL SURGERY

## 2022-04-13 PROCEDURE — 84100 ASSAY OF PHOSPHORUS: CPT | Performed by: STUDENT IN AN ORGANIZED HEALTH CARE EDUCATION/TRAINING PROGRAM

## 2022-04-13 PROCEDURE — 99204 OFFICE O/P NEW MOD 45 MIN: CPT | Performed by: PSYCHIATRY & NEUROLOGY

## 2022-04-13 PROCEDURE — 25010000002 HYDROMORPHONE PER 4 MG: Performed by: STUDENT IN AN ORGANIZED HEALTH CARE EDUCATION/TRAINING PROGRAM

## 2022-04-13 PROCEDURE — 25010000002 NEOSTIGMINE 10 MG/10ML SOLUTION: Performed by: ANESTHESIOLOGY

## 2022-04-13 PROCEDURE — 83735 ASSAY OF MAGNESIUM: CPT | Performed by: STUDENT IN AN ORGANIZED HEALTH CARE EDUCATION/TRAINING PROGRAM

## 2022-04-13 PROCEDURE — 22853 INSJ BIOMECHANICAL DEVICE: CPT | Performed by: NEUROLOGICAL SURGERY

## 2022-04-13 PROCEDURE — 25010000002 LORAZEPAM PER 2 MG: Performed by: STUDENT IN AN ORGANIZED HEALTH CARE EDUCATION/TRAINING PROGRAM

## 2022-04-13 PROCEDURE — 72148 MRI LUMBAR SPINE W/O DYE: CPT

## 2022-04-13 PROCEDURE — 86140 C-REACTIVE PROTEIN: CPT | Performed by: STUDENT IN AN ORGANIZED HEALTH CARE EDUCATION/TRAINING PROGRAM

## 2022-04-13 PROCEDURE — 76000 FLUOROSCOPY <1 HR PHYS/QHP: CPT

## 2022-04-13 PROCEDURE — 25010000002 FENTANYL CITRATE (PF) 50 MCG/ML SOLUTION: Performed by: NURSE ANESTHETIST, CERTIFIED REGISTERED

## 2022-04-13 PROCEDURE — 25010000002 FENTANYL CITRATE (PF) 50 MCG/ML SOLUTION

## 2022-04-13 PROCEDURE — 22853 INSJ BIOMECHANICAL DEVICE: CPT | Performed by: PHYSICIAN ASSISTANT

## 2022-04-13 PROCEDURE — 85652 RBC SED RATE AUTOMATED: CPT | Performed by: INTERNAL MEDICINE

## 2022-04-13 PROCEDURE — 99284 EMERGENCY DEPT VISIT MOD MDM: CPT

## 2022-04-13 PROCEDURE — 96375 TX/PRO/DX INJ NEW DRUG ADDON: CPT

## 2022-04-13 PROCEDURE — 96374 THER/PROPH/DIAG INJ IV PUSH: CPT

## 2022-04-13 PROCEDURE — 80050 GENERAL HEALTH PANEL: CPT | Performed by: STUDENT IN AN ORGANIZED HEALTH CARE EDUCATION/TRAINING PROGRAM

## 2022-04-13 PROCEDURE — 72156 MRI NECK SPINE W/O & W/DYE: CPT

## 2022-04-13 PROCEDURE — 70553 MRI BRAIN STEM W/O & W/DYE: CPT

## 2022-04-13 PROCEDURE — 25010000002 METHYLPREDNISOLONE PER 125 MG: Performed by: STUDENT IN AN ORGANIZED HEALTH CARE EDUCATION/TRAINING PROGRAM

## 2022-04-13 PROCEDURE — 84439 ASSAY OF FREE THYROXINE: CPT | Performed by: STUDENT IN AN ORGANIZED HEALTH CARE EDUCATION/TRAINING PROGRAM

## 2022-04-13 PROCEDURE — 22551 ARTHRD ANT NTRBDY CERVICAL: CPT | Performed by: NEUROLOGICAL SURGERY

## 2022-04-13 PROCEDURE — 72146 MRI CHEST SPINE W/O DYE: CPT

## 2022-04-13 PROCEDURE — 87040 BLOOD CULTURE FOR BACTERIA: CPT | Performed by: STUDENT IN AN ORGANIZED HEALTH CARE EDUCATION/TRAINING PROGRAM

## 2022-04-13 PROCEDURE — 81003 URINALYSIS AUTO W/O SCOPE: CPT | Performed by: STUDENT IN AN ORGANIZED HEALTH CARE EDUCATION/TRAINING PROGRAM

## 2022-04-13 PROCEDURE — 25010000002 KETOROLAC TROMETHAMINE PER 15 MG: Performed by: STUDENT IN AN ORGANIZED HEALTH CARE EDUCATION/TRAINING PROGRAM

## 2022-04-13 PROCEDURE — 99220 PR INITIAL OBSERVATION CARE/DAY 70 MINUTES: CPT | Performed by: INTERNAL MEDICINE

## 2022-04-13 PROCEDURE — 72147 MRI CHEST SPINE W/DYE: CPT

## 2022-04-13 PROCEDURE — 96361 HYDRATE IV INFUSION ADD-ON: CPT

## 2022-04-13 PROCEDURE — 72157 MRI CHEST SPINE W/O & W/DYE: CPT

## 2022-04-13 PROCEDURE — C9803 HOPD COVID-19 SPEC COLLECT: HCPCS

## 2022-04-13 PROCEDURE — 22551 ARTHRD ANT NTRBDY CERVICAL: CPT | Performed by: PHYSICIAN ASSISTANT

## 2022-04-13 PROCEDURE — 0 GADOBENATE DIMEGLUMINE 529 MG/ML SOLUTION: Performed by: INTERNAL MEDICINE

## 2022-04-13 PROCEDURE — 25010000002 MORPHINE PER 10 MG: Performed by: NEUROLOGICAL SURGERY

## 2022-04-13 PROCEDURE — 81025 URINE PREGNANCY TEST: CPT | Performed by: STUDENT IN AN ORGANIZED HEALTH CARE EDUCATION/TRAINING PROGRAM

## 2022-04-13 PROCEDURE — 25010000002 CEFAZOLIN IN DEXTROSE 2-4 GM/100ML-% SOLUTION: Performed by: NEUROLOGICAL SURGERY

## 2022-04-13 PROCEDURE — A9577 INJ MULTIHANCE: HCPCS | Performed by: INTERNAL MEDICINE

## 2022-04-13 DEVICE — HEMOST ABS SURGIFOAM SZ100 8X12 10MM: Type: IMPLANTABLE DEVICE | Site: SPINE CERVICAL | Status: FUNCTIONAL

## 2022-04-13 DEVICE — ALLOGRFT BONE VIVIGEN CELLUAR MATRX FORMABLE 1CC: Type: IMPLANTABLE DEVICE | Site: SPINE CERVICAL | Status: FUNCTIONAL

## 2022-04-13 DEVICE — BENGAL SYSTEM STANDARD IMPLANT 5MM, 7 DEGREES
Type: IMPLANTABLE DEVICE | Site: SPINE CERVICAL | Status: FUNCTIONAL
Brand: BENGAL

## 2022-04-13 DEVICE — PLT SKYLINE 1LEVEL 12MM: Type: IMPLANTABLE DEVICE | Site: SPINE CERVICAL | Status: FUNCTIONAL

## 2022-04-13 DEVICE — FLOSEAL HEMOSTATIC MATRIX, 10ML
Type: IMPLANTABLE DEVICE | Site: SPINE CERVICAL | Status: FUNCTIONAL
Brand: FLOSEAL HEMOSTATIC MATRIX

## 2022-04-13 DEVICE — SCRW SKYLINE VAR SD 14MM: Type: IMPLANTABLE DEVICE | Site: SPINE CERVICAL | Status: FUNCTIONAL

## 2022-04-13 RX ORDER — NEOSTIGMINE METHYLSULFATE 1 MG/ML
INJECTION, SOLUTION INTRAVENOUS AS NEEDED
Status: DISCONTINUED | OUTPATIENT
Start: 2022-04-13 | End: 2022-04-13 | Stop reason: SURG

## 2022-04-13 RX ORDER — PROPOFOL 10 MG/ML
VIAL (ML) INTRAVENOUS AS NEEDED
Status: DISCONTINUED | OUTPATIENT
Start: 2022-04-13 | End: 2022-04-13 | Stop reason: SURG

## 2022-04-13 RX ORDER — MEPERIDINE HYDROCHLORIDE 25 MG/ML
12.5 INJECTION INTRAMUSCULAR; INTRAVENOUS; SUBCUTANEOUS
Status: DISCONTINUED | OUTPATIENT
Start: 2022-04-13 | End: 2022-04-13 | Stop reason: HOSPADM

## 2022-04-13 RX ORDER — BISACODYL 5 MG/1
5 TABLET, DELAYED RELEASE ORAL DAILY PRN
Status: DISCONTINUED | OUTPATIENT
Start: 2022-04-13 | End: 2022-04-14 | Stop reason: HOSPADM

## 2022-04-13 RX ORDER — FENTANYL CITRATE 50 UG/ML
INJECTION, SOLUTION INTRAMUSCULAR; INTRAVENOUS
Status: COMPLETED
Start: 2022-04-13 | End: 2022-04-13

## 2022-04-13 RX ORDER — KETOROLAC TROMETHAMINE 15 MG/ML
15 INJECTION, SOLUTION INTRAMUSCULAR; INTRAVENOUS ONCE
Status: COMPLETED | OUTPATIENT
Start: 2022-04-13 | End: 2022-04-13

## 2022-04-13 RX ORDER — METHYLPREDNISOLONE SODIUM SUCCINATE 125 MG/2ML
125 INJECTION, POWDER, LYOPHILIZED, FOR SOLUTION INTRAMUSCULAR; INTRAVENOUS ONCE
Status: COMPLETED | OUTPATIENT
Start: 2022-04-13 | End: 2022-04-13

## 2022-04-13 RX ORDER — ACETAMINOPHEN 160 MG/5ML
650 SOLUTION ORAL EVERY 4 HOURS PRN
Status: DISCONTINUED | OUTPATIENT
Start: 2022-04-13 | End: 2022-04-14 | Stop reason: HOSPADM

## 2022-04-13 RX ORDER — ROCURONIUM BROMIDE 10 MG/ML
INJECTION, SOLUTION INTRAVENOUS AS NEEDED
Status: DISCONTINUED | OUTPATIENT
Start: 2022-04-13 | End: 2022-04-13 | Stop reason: SURG

## 2022-04-13 RX ORDER — ACETAMINOPHEN 325 MG/1
650 TABLET ORAL EVERY 4 HOURS PRN
Status: DISCONTINUED | OUTPATIENT
Start: 2022-04-13 | End: 2022-04-13 | Stop reason: SDUPTHER

## 2022-04-13 RX ORDER — SODIUM CHLORIDE, SODIUM LACTATE, POTASSIUM CHLORIDE, CALCIUM CHLORIDE 600; 310; 30; 20 MG/100ML; MG/100ML; MG/100ML; MG/100ML
100 INJECTION, SOLUTION INTRAVENOUS CONTINUOUS
Status: DISCONTINUED | OUTPATIENT
Start: 2022-04-13 | End: 2022-04-14

## 2022-04-13 RX ORDER — LIDOCAINE HYDROCHLORIDE AND EPINEPHRINE 5; 5 MG/ML; UG/ML
INJECTION, SOLUTION INFILTRATION; PERINEURAL AS NEEDED
Status: DISCONTINUED | OUTPATIENT
Start: 2022-04-13 | End: 2022-04-13 | Stop reason: HOSPADM

## 2022-04-13 RX ORDER — CEFAZOLIN SODIUM 2 G/100ML
2 INJECTION, SOLUTION INTRAVENOUS ONCE
Status: COMPLETED | OUTPATIENT
Start: 2022-04-13 | End: 2022-04-13

## 2022-04-13 RX ORDER — ONDANSETRON 2 MG/ML
4 INJECTION INTRAMUSCULAR; INTRAVENOUS ONCE AS NEEDED
Status: DISCONTINUED | OUTPATIENT
Start: 2022-04-13 | End: 2022-04-13 | Stop reason: HOSPADM

## 2022-04-13 RX ORDER — ONDANSETRON 2 MG/ML
4 INJECTION INTRAMUSCULAR; INTRAVENOUS EVERY 6 HOURS PRN
Status: DISCONTINUED | OUTPATIENT
Start: 2022-04-13 | End: 2022-04-14 | Stop reason: HOSPADM

## 2022-04-13 RX ORDER — METHOCARBAMOL 750 MG/1
750 TABLET, FILM COATED ORAL 4 TIMES DAILY PRN
COMMUNITY
End: 2022-04-14 | Stop reason: HOSPADM

## 2022-04-13 RX ORDER — SODIUM CHLORIDE, SODIUM LACTATE, POTASSIUM CHLORIDE, CALCIUM CHLORIDE 600; 310; 30; 20 MG/100ML; MG/100ML; MG/100ML; MG/100ML
75 INJECTION, SOLUTION INTRAVENOUS CONTINUOUS
Status: DISCONTINUED | OUTPATIENT
Start: 2022-04-13 | End: 2022-04-14 | Stop reason: HOSPADM

## 2022-04-13 RX ORDER — GLYCOPYRROLATE 0.2 MG/ML
INJECTION INTRAMUSCULAR; INTRAVENOUS AS NEEDED
Status: DISCONTINUED | OUTPATIENT
Start: 2022-04-13 | End: 2022-04-13 | Stop reason: SURG

## 2022-04-13 RX ORDER — AMOXICILLIN 250 MG
2 CAPSULE ORAL 2 TIMES DAILY
Status: DISCONTINUED | OUTPATIENT
Start: 2022-04-13 | End: 2022-04-14 | Stop reason: HOSPADM

## 2022-04-13 RX ORDER — FAMOTIDINE 20 MG/1
20 TABLET, FILM COATED ORAL ONCE
Status: COMPLETED | OUTPATIENT
Start: 2022-04-13 | End: 2022-04-13

## 2022-04-13 RX ORDER — MAGNESIUM HYDROXIDE 1200 MG/15ML
LIQUID ORAL AS NEEDED
Status: DISCONTINUED | OUTPATIENT
Start: 2022-04-13 | End: 2022-04-13 | Stop reason: HOSPADM

## 2022-04-13 RX ORDER — LISINOPRIL 10 MG/1
10 TABLET ORAL DAILY
COMMUNITY
End: 2022-04-14 | Stop reason: HOSPADM

## 2022-04-13 RX ORDER — SODIUM CHLORIDE 0.9 % (FLUSH) 0.9 %
10 SYRINGE (ML) INJECTION AS NEEDED
Status: DISCONTINUED | OUTPATIENT
Start: 2022-04-13 | End: 2022-04-14

## 2022-04-13 RX ORDER — OXYCODONE AND ACETAMINOPHEN 7.5; 325 MG/1; MG/1
1 TABLET ORAL EVERY 4 HOURS PRN
Status: DISCONTINUED | OUTPATIENT
Start: 2022-04-13 | End: 2022-04-14 | Stop reason: HOSPADM

## 2022-04-13 RX ORDER — EPHEDRINE SULFATE 50 MG/ML
5 INJECTION, SOLUTION INTRAVENOUS ONCE AS NEEDED
Status: DISCONTINUED | OUTPATIENT
Start: 2022-04-13 | End: 2022-04-13 | Stop reason: HOSPADM

## 2022-04-13 RX ORDER — ONDANSETRON 2 MG/ML
INJECTION INTRAMUSCULAR; INTRAVENOUS AS NEEDED
Status: DISCONTINUED | OUTPATIENT
Start: 2022-04-13 | End: 2022-04-13 | Stop reason: SURG

## 2022-04-13 RX ORDER — BISACODYL 5 MG/1
5 TABLET, DELAYED RELEASE ORAL DAILY PRN
Status: DISCONTINUED | OUTPATIENT
Start: 2022-04-13 | End: 2022-04-13 | Stop reason: SDUPTHER

## 2022-04-13 RX ORDER — LEVOTHYROXINE SODIUM 0.07 MG/1
75 TABLET ORAL DAILY
Status: DISCONTINUED | OUTPATIENT
Start: 2022-04-13 | End: 2022-04-14 | Stop reason: HOSPADM

## 2022-04-13 RX ORDER — NALOXONE HCL 0.4 MG/ML
0.4 VIAL (ML) INJECTION
Status: DISCONTINUED | OUTPATIENT
Start: 2022-04-13 | End: 2022-04-14 | Stop reason: HOSPADM

## 2022-04-13 RX ORDER — MORPHINE SULFATE 2 MG/ML
2 INJECTION, SOLUTION INTRAMUSCULAR; INTRAVENOUS EVERY 4 HOURS PRN
Status: DISCONTINUED | OUTPATIENT
Start: 2022-04-13 | End: 2022-04-14 | Stop reason: HOSPADM

## 2022-04-13 RX ORDER — BISACODYL 10 MG
10 SUPPOSITORY, RECTAL RECTAL DAILY PRN
Status: DISCONTINUED | OUTPATIENT
Start: 2022-04-13 | End: 2022-04-14 | Stop reason: HOSPADM

## 2022-04-13 RX ORDER — FENTANYL CITRATE 50 UG/ML
50 INJECTION, SOLUTION INTRAMUSCULAR; INTRAVENOUS
Status: DISCONTINUED | OUTPATIENT
Start: 2022-04-13 | End: 2022-04-13 | Stop reason: HOSPADM

## 2022-04-13 RX ORDER — LORAZEPAM 2 MG/ML
0.5 INJECTION INTRAMUSCULAR ONCE
Status: DISCONTINUED | OUTPATIENT
Start: 2022-04-13 | End: 2022-04-13

## 2022-04-13 RX ORDER — SODIUM CHLORIDE 0.9 % (FLUSH) 0.9 %
3 SYRINGE (ML) INJECTION EVERY 12 HOURS SCHEDULED
Status: DISCONTINUED | OUTPATIENT
Start: 2022-04-13 | End: 2022-04-14 | Stop reason: HOSPADM

## 2022-04-13 RX ORDER — LORAZEPAM 2 MG/ML
1 INJECTION INTRAMUSCULAR ONCE
Status: COMPLETED | OUTPATIENT
Start: 2022-04-13 | End: 2022-04-13

## 2022-04-13 RX ORDER — METHOCARBAMOL 750 MG/1
1500 TABLET, FILM COATED ORAL ONCE
Status: COMPLETED | OUTPATIENT
Start: 2022-04-13 | End: 2022-04-13

## 2022-04-13 RX ORDER — CEFAZOLIN SODIUM 2 G/100ML
2 INJECTION, SOLUTION INTRAVENOUS EVERY 8 HOURS
Status: COMPLETED | OUTPATIENT
Start: 2022-04-14 | End: 2022-04-14

## 2022-04-13 RX ORDER — DIAZEPAM 5 MG/1
5 TABLET ORAL EVERY 6 HOURS PRN
Status: DISCONTINUED | OUTPATIENT
Start: 2022-04-13 | End: 2022-04-14 | Stop reason: HOSPADM

## 2022-04-13 RX ORDER — ACETAMINOPHEN 650 MG/1
650 SUPPOSITORY RECTAL EVERY 4 HOURS PRN
Status: DISCONTINUED | OUTPATIENT
Start: 2022-04-13 | End: 2022-04-14 | Stop reason: HOSPADM

## 2022-04-13 RX ORDER — DEXAMETHASONE SODIUM PHOSPHATE 4 MG/ML
INJECTION, SOLUTION INTRA-ARTICULAR; INTRALESIONAL; INTRAMUSCULAR; INTRAVENOUS; SOFT TISSUE AS NEEDED
Status: DISCONTINUED | OUTPATIENT
Start: 2022-04-13 | End: 2022-04-13 | Stop reason: SURG

## 2022-04-13 RX ORDER — LIDOCAINE HYDROCHLORIDE 10 MG/ML
INJECTION, SOLUTION EPIDURAL; INFILTRATION; INTRACAUDAL; PERINEURAL AS NEEDED
Status: DISCONTINUED | OUTPATIENT
Start: 2022-04-13 | End: 2022-04-13 | Stop reason: SURG

## 2022-04-13 RX ORDER — HYDROMORPHONE HYDROCHLORIDE 1 MG/ML
0.5 INJECTION, SOLUTION INTRAMUSCULAR; INTRAVENOUS; SUBCUTANEOUS
Status: DISCONTINUED | OUTPATIENT
Start: 2022-04-13 | End: 2022-04-13 | Stop reason: HOSPADM

## 2022-04-13 RX ORDER — SODIUM CHLORIDE, SODIUM LACTATE, POTASSIUM CHLORIDE, CALCIUM CHLORIDE 600; 310; 30; 20 MG/100ML; MG/100ML; MG/100ML; MG/100ML
9 INJECTION, SOLUTION INTRAVENOUS CONTINUOUS
Status: DISCONTINUED | OUTPATIENT
Start: 2022-04-13 | End: 2022-04-14

## 2022-04-13 RX ORDER — NALOXONE HCL 0.4 MG/ML
0.4 VIAL (ML) INJECTION AS NEEDED
Status: DISCONTINUED | OUTPATIENT
Start: 2022-04-13 | End: 2022-04-13 | Stop reason: HOSPADM

## 2022-04-13 RX ORDER — HYDROMORPHONE HYDROCHLORIDE 1 MG/ML
0.5 INJECTION, SOLUTION INTRAMUSCULAR; INTRAVENOUS; SUBCUTANEOUS ONCE
Status: COMPLETED | OUTPATIENT
Start: 2022-04-13 | End: 2022-04-13

## 2022-04-13 RX ORDER — DIPHENHYDRAMINE HCL 25 MG
25 CAPSULE ORAL NIGHTLY PRN
Status: DISCONTINUED | OUTPATIENT
Start: 2022-04-13 | End: 2022-04-14 | Stop reason: HOSPADM

## 2022-04-13 RX ORDER — POLYETHYLENE GLYCOL 3350 17 G/17G
17 POWDER, FOR SOLUTION ORAL DAILY PRN
Status: DISCONTINUED | OUTPATIENT
Start: 2022-04-13 | End: 2022-04-14 | Stop reason: HOSPADM

## 2022-04-13 RX ORDER — ACETAMINOPHEN 325 MG/1
650 TABLET ORAL EVERY 4 HOURS PRN
Status: DISCONTINUED | OUTPATIENT
Start: 2022-04-13 | End: 2022-04-14 | Stop reason: HOSPADM

## 2022-04-13 RX ORDER — SODIUM CHLORIDE 0.9 % (FLUSH) 0.9 %
10 SYRINGE (ML) INJECTION EVERY 12 HOURS SCHEDULED
Status: DISCONTINUED | OUTPATIENT
Start: 2022-04-13 | End: 2022-04-14

## 2022-04-13 RX ORDER — SODIUM CHLORIDE 0.9 % (FLUSH) 0.9 %
10 SYRINGE (ML) INJECTION AS NEEDED
Status: DISCONTINUED | OUTPATIENT
Start: 2022-04-13 | End: 2022-04-14 | Stop reason: HOSPADM

## 2022-04-13 RX ORDER — LEVOTHYROXINE SODIUM 0.07 MG/1
75 TABLET ORAL DAILY
COMMUNITY

## 2022-04-13 RX ADMIN — NEOSTIGMINE METHYLSULFATE 5 MG: 0.5 INJECTION INTRAVENOUS at 17:50

## 2022-04-13 RX ADMIN — HYDROMORPHONE HYDROCHLORIDE 0.5 MG: 1 INJECTION, SOLUTION INTRAMUSCULAR; INTRAVENOUS; SUBCUTANEOUS at 05:38

## 2022-04-13 RX ADMIN — LIDOCAINE HYDROCHLORIDE 50 MG: 10 INJECTION, SOLUTION EPIDURAL; INFILTRATION; INTRACAUDAL; PERINEURAL at 16:30

## 2022-04-13 RX ADMIN — GADOBENATE DIMEGLUMINE 14 ML: 529 INJECTION, SOLUTION INTRAVENOUS at 11:01

## 2022-04-13 RX ADMIN — FENTANYL CITRATE 50 MCG: 50 INJECTION, SOLUTION INTRAMUSCULAR; INTRAVENOUS at 19:27

## 2022-04-13 RX ADMIN — Medication 10 ML: at 20:34

## 2022-04-13 RX ADMIN — KETOROLAC TROMETHAMINE 15 MG: 15 INJECTION, SOLUTION INTRAMUSCULAR; INTRAVENOUS at 05:38

## 2022-04-13 RX ADMIN — ONDANSETRON 4 MG: 2 INJECTION INTRAMUSCULAR; INTRAVENOUS at 17:34

## 2022-04-13 RX ADMIN — FENTANYL CITRATE 50 MCG: 50 INJECTION, SOLUTION INTRAMUSCULAR; INTRAVENOUS at 18:35

## 2022-04-13 RX ADMIN — FENTANYL CITRATE 50 MCG: 0.05 INJECTION, SOLUTION INTRAMUSCULAR; INTRAVENOUS at 19:27

## 2022-04-13 RX ADMIN — FENTANYL CITRATE 50 MCG: 50 INJECTION, SOLUTION INTRAMUSCULAR; INTRAVENOUS at 18:25

## 2022-04-13 RX ADMIN — PROPOFOL 150 MG: 10 INJECTION, EMULSION INTRAVENOUS at 16:30

## 2022-04-13 RX ADMIN — ROCURONIUM BROMIDE 50 MG: 10 INJECTION INTRAVENOUS at 16:30

## 2022-04-13 RX ADMIN — CEFAZOLIN SODIUM 2 G: 2 INJECTION, SOLUTION INTRAVENOUS at 16:25

## 2022-04-13 RX ADMIN — OXYCODONE HYDROCHLORIDE AND ACETAMINOPHEN 1 TABLET: 7.5; 325 TABLET ORAL at 20:31

## 2022-04-13 RX ADMIN — DEXAMETHASONE SODIUM PHOSPHATE 8 MG: 4 INJECTION INTRA-ARTICULAR; INTRALESIONAL; INTRAMUSCULAR; INTRAVENOUS; SOFT TISSUE at 16:30

## 2022-04-13 RX ADMIN — SUGAMMADEX 200 MG: 100 INJECTION, SOLUTION INTRAVENOUS at 17:55

## 2022-04-13 RX ADMIN — LORAZEPAM 1 MG: 2 INJECTION INTRAMUSCULAR; INTRAVENOUS at 06:28

## 2022-04-13 RX ADMIN — SENNOSIDES AND DOCUSATE SODIUM 2 TABLET: 50; 8.6 TABLET ORAL at 20:31

## 2022-04-13 RX ADMIN — SODIUM CHLORIDE, POTASSIUM CHLORIDE, SODIUM LACTATE AND CALCIUM CHLORIDE 1000 ML: 600; 310; 30; 20 INJECTION, SOLUTION INTRAVENOUS at 06:28

## 2022-04-13 RX ADMIN — METHOCARBAMOL TABLETS 1500 MG: 750 TABLET, COATED ORAL at 05:38

## 2022-04-13 RX ADMIN — GLYCOPYRROLATE 0.8 MG: 0.2 INJECTION INTRAMUSCULAR; INTRAVENOUS at 17:50

## 2022-04-13 RX ADMIN — SODIUM CHLORIDE, POTASSIUM CHLORIDE, SODIUM LACTATE AND CALCIUM CHLORIDE 9 ML/HR: 600; 310; 30; 20 INJECTION, SOLUTION INTRAVENOUS at 15:05

## 2022-04-13 RX ADMIN — FAMOTIDINE 20 MG: 20 TABLET, FILM COATED ORAL at 15:13

## 2022-04-13 RX ADMIN — MORPHINE SULFATE 2 MG: 2 INJECTION, SOLUTION INTRAMUSCULAR; INTRAVENOUS at 22:27

## 2022-04-13 RX ADMIN — METHYLPREDNISOLONE SODIUM SUCCINATE 125 MG: 125 INJECTION, POWDER, FOR SOLUTION INTRAMUSCULAR; INTRAVENOUS at 07:02

## 2022-04-13 RX ADMIN — SODIUM CHLORIDE, POTASSIUM CHLORIDE, SODIUM LACTATE AND CALCIUM CHLORIDE 75 ML/HR: 600; 310; 30; 20 INJECTION, SOLUTION INTRAVENOUS at 20:31

## 2022-04-13 NOTE — PROGRESS NOTES
I have seen and examined the patient again with the mother    I discussed the case personally with Dr. Tracey.  Of also discussed the films with radiology.  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  She is hyperreflexic has proximal muscular weakness and has had progressive weakness in the last 24 hours with a fluctuating exam.    I explained the risk benefits expected outcome from surgery I think it is reasonable to proceed with urgent surgery and I explained all the potential complications.  I suspect that she has some sort of microinstability or sustained a cord contusion from recent chiropractic manipulation or that there is a secondary process but given the size of the disc herniations its not unreasonable to go ahead and operate.

## 2022-04-13 NOTE — ED PROVIDER NOTES
EMERGENCY DEPARTMENT ENCOUNTER    Pt Name: Kiersten Mathews  MRN: [id number]  Pt :   2000  Room Number:    Date of encounter:  2022  PCP: Karen Romano PA  ED Provider: Jaison Steward MD    Historian: Patient      HPI:  Chief Complaint: Back pain, leg weakness        Context: Kiersten Mathews is a 21 y.o. female who presents to the ED c/o 2 weeks of progressive back pain and around 24 hours of significant lower extremity weakness making her unable to ambulate.  She says the pain initially started in her left lower back and was severe but then started affecting her right upper back as well.  Additionally had some pain in her thumb.  She has been seen at multiple emergency departments including this 1 over the last 2 weeks and has been given multiple medications for pain but her pain has continued to progress.  She denies fevers or systemic symptoms.  Does not use IV drugs and her only past medical history is hypertension and hypothyroidism.  She does states she had a chiropractic manipulation performed several days ago as well but her weakness did not start with that.  Yesterday her pain became so bad that she says her legs became weak and she is unable to ambulate.  She says it is not simply that she is limited by pain but rather she is unable to control her lower extremities.  She says she can wiggle her toes but cannot move her legs in any other way and her family has been caring her around the house getting her to the bathroom.  She does not appreciate any urinary retention or incontinence and says she last went to the bathroom at 10 PM.  She denies any saddle anesthesia or any numbness and says she has full sensation in both lower extremities.  Currently rates her back pain as severe diffuse back pain.  It has been progressively worsening with time and has been refractory to all medications that been given her.  No other complaints at this time.      PAST MEDICAL HISTORY  Past Medical  History:   Diagnosis Date   • Hypertension    • Hypothyroidism          PAST SURGICAL HISTORY  Past Surgical History:   Procedure Laterality Date   • WISDOM TOOTH EXTRACTION           FAMILY HISTORY  Family History   Problem Relation Age of Onset   • No Known Problems Mother    • No Known Problems Father    • Diabetes Sister    • Hypertension Sister    • No Known Problems Brother    • Hypertension Maternal Grandmother    • No Known Problems Paternal Grandmother    • No Known Problems Paternal Grandfather          SOCIAL HISTORY  Social History     Socioeconomic History   • Marital status: Single   Tobacco Use   • Smoking status: Never Smoker   • Smokeless tobacco: Never Used   Substance and Sexual Activity   • Alcohol use: No   • Drug use: Never   • Sexual activity: Defer         ALLERGIES  Penicillins        REVIEW OF SYSTEMS  Review of Systems       All systems reviewed and negative except for those discussed in HPI.       PHYSICAL EXAM    I have reviewed the triage vital signs and nursing notes.    ED Triage Vitals [04/13/22 0400]   Temp Heart Rate Resp BP SpO2   98.6 °F (37 °C) 99 18 115/77 98 %      Temp src Heart Rate Source Patient Position BP Location FiO2 (%)   Oral Monitor Sitting Right arm --       Physical Exam  GENERAL:   Appears uncomfortable and in obvious pain but no acute distress  HENT: Nares patent.  EYES: No scleral icterus.  CV: Regular rhythm, regular rate.  RESPIRATORY: Normal effort.  No audible wheezes, rales or rhonchi.  ABDOMEN: Soft, nontender  MUSCULOSKELETAL: Diffuse tenderness to palpation over the back but worse over the mid back both lumbar and thoracic.  No acute injuries or deformities of the lower extremities but she does have complete weakness to gravity with the exception of plantar flexion dorsiflexion.  Recognize light touch over the lower extremities including between the first and second digits.  Normal left patellar reflex but absent right patellar reflex.  Rectal: Has good  rectal tone on digital rectal exam and no external lesions or injuries.  NEURO: Alert, lower extremity weakness see above, follows commands  SKIN: Warm, dry, no rash visualized.        LAB RESULTS  Recent Results (from the past 24 hour(s))   Green Top (Gel)    Collection Time: 04/13/22  4:13 AM   Result Value Ref Range    Extra Tube Hold for add-ons.    Lavender Top    Collection Time: 04/13/22  4:13 AM   Result Value Ref Range    Extra Tube hold for add-on    Gold Top - SST    Collection Time: 04/13/22  4:13 AM   Result Value Ref Range    Extra Tube Hold for add-ons.    Gray Top    Collection Time: 04/13/22  4:13 AM   Result Value Ref Range    Extra Tube Hold for add-ons.    Light Blue Top    Collection Time: 04/13/22  4:13 AM   Result Value Ref Range    Extra Tube hold for add-on    Comprehensive Metabolic Panel    Collection Time: 04/13/22  4:13 AM    Specimen: Blood   Result Value Ref Range    Glucose 104 (H) 65 - 99 mg/dL    BUN 15 6 - 20 mg/dL    Creatinine 0.75 0.57 - 1.00 mg/dL    Sodium 137 136 - 145 mmol/L    Potassium 4.0 3.5 - 5.2 mmol/L    Chloride 100 98 - 107 mmol/L    CO2 24.0 22.0 - 29.0 mmol/L    Calcium 9.1 8.6 - 10.5 mg/dL    Total Protein 7.7 6.0 - 8.5 g/dL    Albumin 4.40 3.50 - 5.20 g/dL    ALT (SGPT) 9 1 - 33 U/L    AST (SGOT) 17 1 - 32 U/L    Alkaline Phosphatase 74 39 - 117 U/L    Total Bilirubin 0.4 0.0 - 1.2 mg/dL    Globulin 3.3 gm/dL    A/G Ratio 1.3 g/dL    BUN/Creatinine Ratio 20.0 7.0 - 25.0    Anion Gap 13.0 5.0 - 15.0 mmol/L    eGFR 115.6 >60.0 mL/min/1.73   Lipase    Collection Time: 04/13/22  4:13 AM    Specimen: Blood   Result Value Ref Range    Lipase 25 13 - 60 U/L   Lactic Acid, Plasma    Collection Time: 04/13/22  4:13 AM    Specimen: Blood   Result Value Ref Range    Lactate 0.8 0.5 - 2.0 mmol/L   Procalcitonin    Collection Time: 04/13/22  4:13 AM    Specimen: Blood   Result Value Ref Range    Procalcitonin 0.05 0.00 - 0.25 ng/mL   C-reactive Protein    Collection Time:  04/13/22  4:13 AM    Specimen: Blood   Result Value Ref Range    C-Reactive Protein 6.21 (H) 0.00 - 0.50 mg/dL   Magnesium    Collection Time: 04/13/22  4:13 AM    Specimen: Blood   Result Value Ref Range    Magnesium 1.8 1.6 - 2.6 mg/dL   Phosphorus    Collection Time: 04/13/22  4:13 AM    Specimen: Blood   Result Value Ref Range    Phosphorus 2.6 2.5 - 4.5 mg/dL   TSH    Collection Time: 04/13/22  4:13 AM    Specimen: Blood   Result Value Ref Range    TSH 29.640 (H) 0.270 - 4.200 uIU/mL   T4, Free    Collection Time: 04/13/22  4:13 AM    Specimen: Blood   Result Value Ref Range    Free T4 1.05 0.93 - 1.70 ng/dL   CBC Auto Differential    Collection Time: 04/13/22  4:13 AM    Specimen: Blood   Result Value Ref Range    WBC 13.04 (H) 3.40 - 10.80 10*3/mm3    RBC 4.04 3.77 - 5.28 10*6/mm3    Hemoglobin 12.6 12.0 - 15.9 g/dL    Hematocrit 37.4 34.0 - 46.6 %    MCV 92.6 79.0 - 97.0 fL    MCH 31.2 26.6 - 33.0 pg    MCHC 33.7 31.5 - 35.7 g/dL    RDW 13.2 12.3 - 15.4 %    RDW-SD 44.5 37.0 - 54.0 fl    MPV 10.8 6.0 - 12.0 fL    Platelets 332 140 - 450 10*3/mm3    Neutrophil % 76.1 (H) 42.7 - 76.0 %    Lymphocyte % 14.3 (L) 19.6 - 45.3 %    Monocyte % 8.3 5.0 - 12.0 %    Eosinophil % 0.7 0.3 - 6.2 %    Basophil % 0.2 0.0 - 1.5 %    Immature Grans % 0.4 0.0 - 0.5 %    Neutrophils, Absolute 9.93 (H) 1.70 - 7.00 10*3/mm3    Lymphocytes, Absolute 1.86 0.70 - 3.10 10*3/mm3    Monocytes, Absolute 1.08 (H) 0.10 - 0.90 10*3/mm3    Eosinophils, Absolute 0.09 0.00 - 0.40 10*3/mm3    Basophils, Absolute 0.03 0.00 - 0.20 10*3/mm3    Immature Grans, Absolute 0.05 0.00 - 0.05 10*3/mm3    nRBC 0.0 0.0 - 0.2 /100 WBC       If labs were ordered, I independently reviewed the results.        RADIOLOGY  No Radiology Exams Resulted Within Past 24 Hours    I ordered and reviewed the above noted radiographic studies.      Noncontrasted MRIs of the C, T, L-spine which do not reveal any acute abnormalities that I can appreciate no appreciated cord  compression.    See radiologist's dictation for official interpretation.        PROCEDURES    Procedures    No orders to display       MEDICATIONS GIVEN IN ER    Medications   sodium chloride 0.9 % flush 10 mL (has no administration in time range)   ketorolac (TORADOL) injection 15 mg (15 mg Intravenous Given 4/13/22 0538)   HYDROmorphone (DILAUDID) injection 0.5 mg (0.5 mg Intravenous Given 4/13/22 0538)   methocarbamol (ROBAXIN) tablet 1,500 mg (1,500 mg Oral Given 4/13/22 0538)   lactated ringers bolus 1,000 mL (1,000 mL Intravenous New Bag 4/13/22 0628)   LORazepam (ATIVAN) injection 1 mg (1 mg Intravenous Given 4/13/22 0628)   methylPREDNISolone sodium succinate (SOLU-Medrol) injection 125 mg (125 mg Intravenous Given 4/13/22 0702)         PROGRESS, DATA ANALYSIS, CONSULTS, AND MEDICAL DECISION MAKING    All labs have been independently reviewed by me.  All radiology studies have been reviewed by me and the radiologist dictating the report.   EKG's have been independently viewed and interpreted by me.      Patient arrived awake and alert vitals within normal limits but she is demonstrating essentially complete lower extremity paralysis.  She has intact plantar flexion and dorsiflexion but cannot lift to gravity at all and does seem to be making a genuine effort.  She has patellar reflexes on the left but no appreciated patellar reflexes on the right.  She has no numbness.  She is not able to currently urinate but bladder scan shows just over 200 cc of fluid so is unclear whether she has an actual retention or not.  She has good rectal tone.  I do not appreciate any neurologic deficits above the waist.  I have ordered noncontrast MRIs of both the thoracic and lumbar spine.  Treating pain with Toradol, Dilaudid, methocarbamol, and IV fluids.  Labs reveal mild leukocytosis but she is afebrile.  She is likely been treated with corticosteroids for her back pain in the course of her 4 different emergency department  visits.  Significantly elevated TSH consistent with her known hypothyroidism but T4 is within normal limits.  No elevation in lactate.  She does also have elevation in CRP.  I discussed the case with Deacon Shaikh with the neurosurgery team who recommends obtaining MRI of the C-spine as well.  He came and evaluated the patient agrees that she is weak but it is a bizarre neurologic exam.  He recommended going ahead and starting corticosteroids and she was started on Solu-Medrol.  Initial MRIs performed and do not reveal any acute findings but neurosurgery team is adding on contrasted MRIs as well.  At this point she is unable to ambulate and will need hospital admission.  Medicine team consulted for admission.                 AS OF 08:36 EDT VITALS:    BP - 128/75  HR - 90  TEMP - 98.6 °F (37 °C) (Oral)  O2 SATS - 95%                  DIAGNOSIS  Final diagnoses:   Acute left-sided low back pain, unspecified whether sciatica present   Acute right-sided thoracic back pain   Paralysis of left lower extremity (HCC)   Paralysis of right lower extremity (HCC)         DISPOSITION  Admit             Jaison Steward MD  04/13/22 0920

## 2022-04-13 NOTE — ED PROVIDER NOTES
[facility]    EMERGENCY DEPARTMENT ENCOUNTER      Pt Name: Kiersten Mathews  MRN: [id number]  YOB: 2000  Date of evaluation: 4/11/2022  Provider: TIARA Murrieta    CHIEF COMPLAINT       Chief Complaint   Patient presents with   • Back Pain         HISTORY OF PRESENT ILLNESS  (Location/Symptom, Timing/Onset, Context/Setting, Quality, Duration, Modifying Factors, Severity.)   Kiersten Mathews is a 21 y.o. female who presents to the emergency department with complaints of spasm and tightness in the right scapular region of her back. Patient reports that her symptoms initially started in her lower back put no have moved into her upper back. Patient shares that she was seen and evaluated at the ER in [place], [place] where she was discharged with Robaxin and Ketorlac. Patient reports taking this medications as prescribed but that they are not helping her back. She reports the pain is worse when she goes to get up and that at rest she is not in pain. She explains that she has been feeling bad for about the last month with symptoms of sore throat, cough, congestion and fatigue. No additional complaints on interview and exam.     HPI   Nursing notes were reviewed.    REVIEW OF SYSTEMS    (2-9 systems for level 4, 10 or more for level 5)   Review of Systems   Constitutional: Positive for activity change and fatigue. Negative for appetite change, chills and fever.   HENT: Positive for congestion, rhinorrhea and sore throat.    Respiratory: Negative.    Cardiovascular: Negative.    Gastrointestinal: Negative.    Genitourinary: Negative.    Musculoskeletal: Positive for arthralgias, back pain and myalgias.   Skin: Negative.    Neurological: Negative.         All systems reviewed and negative except for those discussed in HPI.   PAST MEDICAL HISTORY   No past medical history on file.      SURGICAL HISTORY       Past Surgical History:   Procedure Laterality Date   • WISDOM TOOTH EXTRACTION           CURRENT MEDICATIONS      No current facility-administered medications for this encounter.    Current Outpatient Medications:   •  nitrofurantoin, macrocrystal-monohydrate, (MACROBID) 100 MG capsule, Take 1 capsule by mouth 2 (Two) Times a Day for 5 days., Disp: 10 capsule, Rfl: 0  •  tiZANidine (ZANAFLEX) 2 MG tablet, Take 1 tablet by mouth Every 6 (Six) Hours As Needed for Muscle Spasms for up to 3 days., Disp: 12 tablet, Rfl: 0    ALLERGIES     Penicillins    FAMILY HISTORY       Family History   Problem Relation Age of Onset   • No Known Problems Mother    • No Known Problems Father    • Diabetes Sister    • Hypertension Sister    • No Known Problems Brother    • Hypertension Maternal Grandmother    • No Known Problems Paternal Grandmother    • No Known Problems Paternal Grandfather           SOCIAL HISTORY       Social History     Socioeconomic History   • Marital status: Single   Tobacco Use   • Smoking status: Never Smoker   • Smokeless tobacco: Never Used   Substance and Sexual Activity   • Alcohol use: No   • Drug use: Never   • Sexual activity: Defer         PHYSICAL EXAM    (up to 7 for level 4, 8 or more for level 5)   Physical Exam  Vitals and nursing note reviewed.   Constitutional:       General: She is not in acute distress.     Appearance: Normal appearance. She is well-developed. She is not ill-appearing or toxic-appearing.   HENT:      Head: Normocephalic and atraumatic.      Nose: Nose normal.      Mouth/Throat:      Mouth: Mucous membranes are moist.   Eyes:      Extraocular Movements: Extraocular movements intact.   Cardiovascular:      Rate and Rhythm: Normal rate and regular rhythm.   Pulmonary:      Effort: Pulmonary effort is normal. No respiratory distress.      Breath sounds: Normal breath sounds.   Abdominal:      General: There is no distension.      Palpations: Abdomen is soft.      Tenderness: There is no abdominal tenderness.   Musculoskeletal:      Cervical back: Normal range of motion and neck supple.         Back:    Skin:     General: Skin is warm and dry.   Neurological:      General: No focal deficit present.      Mental Status: She is alert.   Psychiatric:         Behavior: Behavior normal.         Thought Content: Thought content normal.         Judgment: Judgment normal.          DIAGNOSTIC RESULTS     EKG: All EKGs are interpreted by the Emergency Department Physician who either signs or Co-signs this chart in the absence of a cardiologist.    ECG 12 Lead   Preliminary Result             RADIOLOGY:   Non-plain film images such as CT, Ultrasound and MRI are read by the radiologist. Plain radiographic images are visualized and preliminarily interpreted by the emergency physician with the below findings:      [x] Radiologist's Report Reviewed:  CT Chest Without Contrast Diagnostic   Final Result   Normal CT examination of the chest.      Electronically signed by:  Jaison Weir M.D.     4/11/2022 10:24 PM Mountain Time            ED BEDSIDE ULTRASOUND:   Performed by ED Physician - none    LABS:    I have reviewed and interpreted all of the currently available lab results from this visit (if applicable):  Results for orders placed or performed during the hospital encounter of 04/11/22   Comprehensive Metabolic Panel    Specimen: Blood   Result Value Ref Range    Glucose 85 65 - 99 mg/dL    BUN 25 (H) 6 - 20 mg/dL    Creatinine 0.87 0.57 - 1.00 mg/dL    Sodium 139 136 - 145 mmol/L    Potassium 4.0 3.5 - 5.2 mmol/L    Chloride 103 98 - 107 mmol/L    CO2 26.0 22.0 - 29.0 mmol/L    Calcium 9.5 8.6 - 10.5 mg/dL    Total Protein 7.5 6.0 - 8.5 g/dL    Albumin 4.20 3.50 - 5.20 g/dL    ALT (SGPT) 9 1 - 33 U/L    AST (SGOT) 16 1 - 32 U/L    Alkaline Phosphatase 79 39 - 117 U/L    Total Bilirubin 0.2 0.0 - 1.2 mg/dL    Globulin 3.3 gm/dL    A/G Ratio 1.3 g/dL    BUN/Creatinine Ratio 28.7 (H) 7.0 - 25.0    Anion Gap 10.0 5.0 - 15.0 mmol/L    eGFR 96.7 >60.0 mL/min/1.73   Lipase    Specimen: Blood   Result Value Ref  Range    Lipase 29 13 - 60 U/L   CBC Auto Differential    Specimen: Blood   Result Value Ref Range    WBC 10.37 3.40 - 10.80 10*3/mm3    RBC 3.87 3.77 - 5.28 10*6/mm3    Hemoglobin 11.9 (L) 12.0 - 15.9 g/dL    Hematocrit 35.9 34.0 - 46.6 %    MCV 92.8 79.0 - 97.0 fL    MCH 30.7 26.6 - 33.0 pg    MCHC 33.1 31.5 - 35.7 g/dL    RDW 13.2 12.3 - 15.4 %    RDW-SD 45.1 37.0 - 54.0 fl    MPV 10.5 6.0 - 12.0 fL    Platelets 313 140 - 450 10*3/mm3    Neutrophil % 60.1 42.7 - 76.0 %    Lymphocyte % 28.8 19.6 - 45.3 %    Monocyte % 8.1 5.0 - 12.0 %    Eosinophil % 2.5 0.3 - 6.2 %    Basophil % 0.3 0.0 - 1.5 %    Immature Grans % 0.2 0.0 - 0.5 %    Neutrophils, Absolute 6.23 1.70 - 7.00 10*3/mm3    Lymphocytes, Absolute 2.99 0.70 - 3.10 10*3/mm3    Monocytes, Absolute 0.84 0.10 - 0.90 10*3/mm3    Eosinophils, Absolute 0.26 0.00 - 0.40 10*3/mm3    Basophils, Absolute 0.03 0.00 - 0.20 10*3/mm3    Immature Grans, Absolute 0.02 0.00 - 0.05 10*3/mm3    nRBC 0.0 0.0 - 0.2 /100 WBC   Urinalysis With Microscopic If Indicated (No Culture) - Urine, Clean Catch    Specimen: Urine, Clean Catch   Result Value Ref Range    Color, UA Yellow Yellow, Straw    Appearance, UA Cloudy (A) Clear    pH, UA 5.5 5.0 - 8.0    Specific Gravity, UA 1.037 (H) 1.001 - 1.030    Glucose, UA Negative Negative    Ketones, UA Trace (A) Negative    Bilirubin, UA Negative Negative    Blood, UA Negative Negative    Protein, UA Negative Negative    Leuk Esterase, UA Negative Negative    Nitrite, UA Negative Negative    Urobilinogen, UA 0.2 E.U./dL 0.2 - 1.0 E.U./dL   Urinalysis, Microscopic Only - Urine, Clean Catch    Specimen: Urine, Clean Catch   Result Value Ref Range    RBC, UA 0-2 None Seen, 0-2 /HPF    WBC, UA 6-12 (A) None Seen, 0-2 /HPF    Bacteria, UA 2+ (A) None Seen, Trace /HPF    Squamous Epithelial Cells, UA 3-6 (A) None Seen, 0-2 /HPF    Hyaline Casts, UA 0-6 0 - 6 /LPF    Methodology Automated Microscopy         All other labs were within normal range  "or not returned as of this dictation.      EMERGENCY DEPARTMENT COURSE and DIFFERENTIAL DIAGNOSIS/MDM:   Vitals:    Vitals:    04/11/22 2209 04/11/22 2330 04/12/22 0030   BP: 132/85 127/82 129/86   BP Location: Left arm Right arm Right arm   Patient Position: Sitting Lying Lying   Pulse: 87 86 81   Resp: 18 18 18   Temp: 98 °F (36.7 °C)     TempSrc: Oral     SpO2: 98% 97% 97%   Weight: 68 kg (150 lb)     Height: 154.9 cm (61\")         ED Course as of 04/12/22 2043 Tue Apr 12, 2022   0102 Patient present to ED with complaints of muscle spasms in her back that have not improved with Robaxin and Toradol prescribed from her ER visit last night in [place], [place]. No acute or emergent findings demonstrated on physical exam.  Patient has been treated recently for UTI which per UA does not appear to have fully cleared. No additional acute abnormalities on labs. Patient responded well to treatment with Valium in ED with moderate symptomatic improvement. Will continue treatment of UTI and provide additional substitute for muscle relxer outpatient. Patient is afebrile, nontoxic appearing, vital signs stable and able to maintain O2 sats of 97% on room air. Patient will be discharged home with symptomatic care and outpatient follow up.  [JG]      ED Course User Index  [JG] Salas Abbasi PA         MDM  Number of Diagnoses or Management Options  Acute UTI (urinary tract infection): new, needed workup  Spasm of muscle, back: established, worsening     Amount and/or Complexity of Data Reviewed  Clinical lab tests: reviewed  Tests in the radiology section of CPT®: reviewed    Risk of Complications, Morbidity, and/or Mortality  Presenting problems: low  Diagnostic procedures: low  Management options: low    Patient Progress  Patient progress: improved       I had a discussion with the patient/family regarding diagnosis, diagnostic results, treatment plan, and medications.  The patient/family indicated understanding of these " instructions.  I spent adequate time at the bedside preceding discharge necessary to personally discuss the aftercare instructions, giving patient education, providing explanations of the results of our evaluations/findings, and my decision making to assure that the patient/family understand the plan of care.  Time was allotted to answer questions at that time and throughout the ED course.  Emphasis was placed on timely follow-up after discharge.  I also discussed the potential for the development of an acute emergent condition requiring further evaluation, admission, or even surgical intervention. I discussed that we found nothing during the visit today indicating the need for further workup, admission, or the presence of an unstable medical condition.  I encouraged the patient to return to the emergency department immediately for ANY concerns, worsening, new complaints, or if symptoms persist and unable to seek follow-up in a timely fashion.  The patient/family expressed understanding and agreement with this plan.  The patient will follow-up with primary care for reevaluation.       MEDICATIONS ADMINISTERED IN ED:  Medications   diazePAM (VALIUM) injection 5 mg (5 mg Intravenous Given 4/12/22 0013)       PROCEDURES:  Procedures          CRITICAL CARE TIME    Total Critical Care time was 0 minutes, excluding separately reportable procedures.   There was a high probability of clinically significant/life threatening deterioration in the patient's condition which required my urgent intervention.      FINAL IMPRESSION      1. Acute UTI (urinary tract infection)    2. Spasm of muscle, back          DISPOSITION/PLAN     ED Disposition     ED Disposition   Discharge    Condition   Stable    Comment   --             PATIENT REFERRED TO:  Porsche Santos APRN  [id number]  [facility] [address]  [phone]    Schedule an appointment as soon as possible for a visit   Call for follow up with your primary  [facility] Emergency Department  [address] [facility] [id number] [phone]  Go to   If symptoms worsen      DISCHARGE MEDICATIONS:     Medication List      START taking these medications    nitrofurantoin (macrocrystal-monohydrate) 100 MG capsule  Commonly known as: MACROBID  Take 1 capsule by mouth 2 (Two) Times a Day for 5 days.     tiZANidine 2 MG tablet  Commonly known as: ZANAFLEX  Take 1 tablet by mouth Every 6 (Six) Hours As Needed for Muscle Spasms for up to 3 days.           Where to Get Your Medications      These medications were sent to [address] AT [facility] ([place] - [phone]  - [phone]   [address] [facility] [address]    Phone: [phone]   · nitrofurantoin (macrocrystal-monohydrate) 100 MG capsule  · tiZANidine 2 MG tablet             Comment: Please note this report has been produced using speech recognition software.      TIARA Murrieta Jason C, PA  04/12/22 7645

## 2022-04-13 NOTE — H&P
[facility] Medicine Services  HISTORY AND PHYSICAL    Patient Name: Kiersten Mathews  : 2000  MRN: [id number]  Primary Care Physician: Karen Romano PA  Date of admission: 2022      Subjective   Subjective     Chief Complaint:   Lower extremity weakness    HPI:  Kiersten Mathews is a 21 y.o. female with history of hypertension hypothyroid who presents with progressive back pain, back spasms and lower extremity weakness.  States that she developed low back pain about 4 weeks ago. Denies injury. Pain was progressive and then noticed muscle spasms about 2 weeks ago.  States that she has been to different urgent cares and ERs during this time.  Yesterday developed lower extremity weakness.  States she had been able to urinate until right now during her interview.  Back pain aggravated with movement.  Denies any relieving interventions.  Respiratory illness about 4 weeks ago.      Review of Systems   Gen- No fevers, chills  CV- No chest pain, palpitations  Resp- No cough, dyspnea  GI- No N/V/D, abd pain    All other systems reviewed and are negative.     Personal History     Past Medical History:   Diagnosis Date   • Hypertension    • Hypothyroidism              Past Surgical History:   Procedure Laterality Date   • WISDOM TOOTH EXTRACTION         Family History:  family history includes Diabetes in her sister; Hypertension in her maternal grandmother and sister; No Known Problems in her brother, father, mother, paternal grandfather, and paternal grandmother. Otherwise pertinent FHx was reviewed and unremarkable.     Social History:  reports that she has never smoked. She has never used smokeless tobacco. She reports that she does not drink alcohol and does not use drugs.  Social History     Social History Narrative    Caffeine: none        Medications:  Available home medication information reviewed.  (Not in a hospital admission)      Allergies   Allergen Reactions   •  Penicillins Hives       Objective   Objective     Vital Signs:   Temp:  [98.6 °F (37 °C)] 98.6 °F (37 °C)  Heart Rate:  [89-99] 94  Resp:  [16-18] 16  BP: (115-128)/(68-77) 122/68       Physical Exam   Constitutional: Awake, alert  Eyes: PERRLA, sclerae anicteric, no conjunctival injection  HENT: NCAT, mucous membranes moist  Neck: Supple, no thyromegaly, no lymphadenopathy, trachea midline  Respiratory: Clear to auscultation bilaterally, nonlabored respirations   Cardiovascular: RRR, no murmurs, rubs, or gallops, palpable pedal pulses bilaterally  Gastrointestinal: Positive bowel sounds, soft, nontender, nondistended  Musculoskeletal: No bilateral ankle edema, no clubbing or cyanosis to extremities  Psychiatric: Appropriate affect, cooperative  Neurologic: Oriented x 3, sensation and strength intact upper ext; lower ext 3-4/5 strength, Cranial Nerves grossly intact to confrontation, speech clear  Skin: No rashes      Result Review:  I have personally reviewed the results from the time of this admission to 4/13/2022 11:33 EDT and agree with these findings:  []  Laboratory  []  Microbiology  []  Radiology  []  EKG/Telemetry   []  Cardiology/Vascular   []  Pathology  []  Old records  []  Other:  Most notable findings include:       LAB RESULTS:      Lab 04/13/22 0413 04/11/22  2312   WBC 13.04* 10.37   HEMOGLOBIN 12.6 11.9*   HEMATOCRIT 37.4 35.9   PLATELETS 332 313   NEUTROS ABS 9.93* 6.23   IMMATURE GRANS (ABS) 0.05 0.02   LYMPHS ABS 1.86 2.99   MONOS ABS 1.08* 0.84   EOS ABS 0.09 0.26   MCV 92.6 92.8   CRP 6.21*  --    PROCALCITONIN 0.05  --    LACTATE 0.8  --          Lab 04/13/22  0413 04/11/22  2312   SODIUM 137 139   POTASSIUM 4.0 4.0   CHLORIDE 100 103   CO2 24.0 26.0   ANION GAP 13.0 10.0   BUN 15 25*   CREATININE 0.75 0.87   EGFR 115.6 96.7   GLUCOSE 104* 85   CALCIUM 9.1 9.5   MAGNESIUM 1.8  --    PHOSPHORUS 2.6  --    TSH 29.640*  --          Lab 04/13/22  0413 04/11/22  2312   TOTAL PROTEIN 7.7 7.5    ALBUMIN 4.40 4.20   GLOBULIN 3.3 3.3   ALT (SGPT) 9 9   AST (SGOT) 17 16   BILIRUBIN 0.4 0.2   ALK PHOS 74 79   LIPASE 25 29                     UA    Urinalysis 4/11/22 4/11/22    2306 2306   Squamous Epithelial Cells, UA  3-6 (A)   Specific Gravity, UA 1.037 (A)    Ketones, UA Trace (A)    Blood, UA Negative    Leukocytes, UA Negative    Nitrite, UA Negative    RBC, UA  0-2   WBC, UA  6-12 (A)   Bacteria, UA  2+ (A)   (A) Abnormal value              Microbiology Results (last 10 days)     ** No results found for the last 240 hours. **          CT Chest Without Contrast Diagnostic    Result Date: 4/12/2022  Exam: CT Chest without IV contrast History: Right scapular pain and spasms. Technique: CT examination of the chest was performed without IV contrast.. Multiplanar reformatted images were provided.  CT dose lowering techniques were used, to include: automated exposure control, adjustment for patient size, and or use of iterative reconstruction. Comparison: None FINDINGS: Pulmonary Parenchyma and Airways: Calcified granuloma in the left lung base with some left basilar atelectasis.. The central airways are patent. Pleural and Pericardial spaces: No pleural or pericardial effusion. No obvious pleural or pericardial mass. Axilla, Thyroid: No enlarged axillary or supraclavicular nodes. Visualized thyroid is normal.. Mediastinum and Celine: No mediastinal or hilar masses or enlarged nodes. Cardiovascular: No evidence of aortic aneurysm or aortic dissection. The heart size is normal. The pulmonary artery is normal in size. Upper Abdomen: Normal. Bones: No fracture or aggressive osseous lesion.     Impression: Normal CT examination of the chest. Electronically signed by:  Jaison Weir M.D.  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  TECHNIQUE: Routine magnetic resonance imaging of the thoracic spine was performed without the administration of contrast.  FINDINGS: MRI thoracic spine: The signal within the marrow visualized thoracic vertebra does not appear unusual. The conus is around the level of T12. No definite intrinsic cord abnormality is appreciated on this exam. There is some atelectasis left basilar area.  It is noted that there is a disc osteophyte complex at C6-7 left of central with some flattening of the anterior left aspect of the spinal cord. No definite disc herniation or intervertebral foraminal stenosis is apparent within the thoracic spine.  MRI lumbar spine: The conus is around the level of T12. The discs appear well-hydrated. The central canal seems capacious.  T12-L1: No definite disc herniation or intervertebral foraminal stenosis.  L1-L2: No definite disc herniation or intervertebral foraminal stenosis.  L2-L3: No definite disc herniation or intervertebral foraminal stenosis.  L3-L4: No definite disc herniation or intervertebral foraminal stenosis.  L4-L5: No definite disc herniation or intervertebral foraminal stenosis.  L5-S1: No definite disc herniation or intervertebral foraminal stenosis.      Impression: 1.  Disc osteophyte complex left of central C6-7. 2.  No significant abnormality within the thoracic spine. 3.  No significant abnormality within the lumbar spine. 4.  Suggested atelectasis left lower lobe in what is visualized of the chest.  This report was finalized on 4/13/2022 9:01 AM by Ash Rosa MD.      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  TECHNIQUE: Routine magnetic resonance imaging of the thoracic spine was performed without the administration of contrast.  FINDINGS: MRI thoracic spine: The signal within the marrow visualized thoracic vertebra does not appear unusual. The conus is around the level of T12. No definite intrinsic cord abnormality is appreciated on this exam. There is some atelectasis left basilar area.  It is noted that there is a disc osteophyte complex at C6-7 left of central with some flattening of the anterior left aspect of the spinal cord. No definite disc herniation or intervertebral foraminal stenosis is apparent within the thoracic spine.  MRI lumbar spine: The conus is around the level of T12. The discs appear well-hydrated. The central canal seems capacious.  T12-L1: No definite disc herniation or intervertebral foraminal stenosis.  L1-L2: No definite disc herniation or intervertebral foraminal stenosis.  L2-L3: No definite disc herniation or intervertebral foraminal stenosis.  L3-L4: No definite disc herniation or intervertebral foraminal stenosis.  L4-L5: No definite disc herniation or intervertebral foraminal stenosis.  L5-S1: No definite disc herniation or intervertebral foraminal stenosis.      Impression: 1.  Disc osteophyte complex left of central C6-7. 2.  No significant abnormality within the thoracic spine. 3.  No significant abnormality within the lumbar spine. 4.  Suggested atelectasis left lower lobe in what is visualized of the chest.  This report was finalized on 4/13/2022 9:01 AM by Ash Rosa MD.         Results for orders placed during the hospital encounter of 06/30/21    Adult Transthoracic Echo Complete W/ Cont if Necessary Per Protocol    Interpretation Summary  · Left ventricular systolic function is normal. Estimated left ventricular EF = 55%.  · The cardiac valves are anatomically and functionally normal.      Assessment/Plan   Assessment & Plan     Active Hospital Problems    Diagnosis  POA   • Acute left-sided low back pain, unspecified whether sciatica present [M54.50]  Yes   • Essential hypertension [I10]  Unknown   • Hypothyroidism (acquired) [E03.9]  Unknown       Kiersten Mathews is a 21 y.o. female with history of hypertension hypothyroid who presents with progressive back pain, back spasms and lower extremity weakness.      Back pain  Lower ext weakness   Paraparesis   - Patient has been evaluated by NSGY and neuro consulted  - MRI with osteophyte at C6-7   - MRI brain and thoracic spine pending  - Continue NPO  - Urinary retention protocol   - Follow up neuro recs     HTN   - Holding lisinopril; monitor    Hypothyroid  - Elevated TSH but was taking synthroid with other medication  - Continue current synthroid dosing; repeat labs with PCP in 4-6 weeks     DVT prophylaxis:  SCDs      CODE STATUS:    Code Status and Medical Interventions:   Ordered at: 04/13/22 1132     Code Status (Patient has no pulse and is not breathing):    CPR (Attempt to Resuscitate)     Medical Interventions (Patient has pulse or is breathing):    Full Support         Cate Serrano DO  04/13/22

## 2022-04-13 NOTE — OP NOTE
Preoperative diagnosis cervical disc disease C6-7 with some cord compression weakness legs    Postoperative diagnosis same    Procedures  1.  Anterior cervical discectomy C6-7  2.  Anterior arthrodesis C6-7  3.  Placement of the bangle cage packed with VI V GEN bone graft    4.  Anterior segmental instrumentation using Depuy skyline plate and screws  5.  Fluoroscopy to confirm level intraoperative    Gen. endotracheal anesthesia    Surgeon Andrea Fields M.D.  Assistant Deacon Shaikh my physician's assistant was present and personally scrubbed the entirety of the procedure, they assisted suctioning, retraction, approach, and closure of the case    Minimal blood loss  Applications none  Procedure in detail    After formal written consent was obtained explaining risks and benefits of procedure patient was taken to operating room.  All bony prominences and genitalia were padded to prevent neurologic injury.  Preoperative antimicrobial prophylaxis was given per protocol.  Patient was placed in neutral C-spine and prepped and draped in usual sterile manner.  Anterior incision was made after local infiltration per record.  I explained explicitly that this is a bit of an unusual case she has a sizable disc herniation with parascapular pain but paralysis intermittently following chiropractic manipulation given some cord rotation the patient elected to undergo surgery as well as to help alleviate the neck pain I felt it would expedite the pain that been going on for at least 3 weeks but I did explain secondary causes of the weakness could be discovered later      Dissection was carried down to skin and subcutaneous tissues.  The carotid artery and tracheoesophageal bundle were gently dissected and mobilized respectively to allow access to the anterior longitudinal spine and ligament.  At this point in time soft tissues were cleared using blunt dissection fluoroscopic guidance identified the correct levels.  At this point in  time a thorough discectomy was performed after placement of distraction pins.  The dissection planes were placed at C6-7 the posterior ligament was identified after thorough discectomy and drilled down using high-speed bur the posterior longitudinal ligament was removed and the disc space was removed and explored to ensure adequate decompression of the nerves and nerve roots respectively.  Fluoroscopic guidance confirmed correct levels and decompression    At this point time the anterior bangle cage was packed with VI V GEN DMP bone graft was placed after being sized, and the anterior plating system was placed and torqued to appropriate tightness.  Arthrodesis was complete at C6-7 fluoroscopic imaging identified again the correct level meticulous hemostasis maintained and the skin was closed in layers.    I performed the entire surgery till the closure of the skin.  I discussed the findings and follow-up with the family

## 2022-04-13 NOTE — ANESTHESIA PREPROCEDURE EVALUATION
Anesthesia Evaluation     Patient summary reviewed and Nursing notes reviewed                Airway   Mallampati: II  TM distance: >3 FB  Neck ROM: full  No difficulty expected  Dental - normal exam     Pulmonary - negative pulmonary ROS and normal exam   Cardiovascular - normal exam    (+) hypertension,     ROS comment:   Echo 6/21: normal EF/valves  Stress test 6/21: no ischemia/low risk study    Neuro/Psych  (+) weakness,    GI/Hepatic/Renal/Endo    (+)   thyroid problem hypothyroidism    Musculoskeletal (-) negative ROS    Abdominal  - normal exam    Bowel sounds: normal.   Substance History - negative use     OB/GYN negative ob/gyn ROS         Other                        Anesthesia Plan    ASA 2     general   ([facility])  intravenous induction     Anesthetic plan, all risks, benefits, and alternatives have been provided, discussed and informed consent has been obtained with: patient.    Plan discussed with CRNA.        CODE STATUS:    Code Status (Patient has no pulse and is not breathing): CPR (Attempt to Resuscitate)  Medical Interventions (Patient has pulse or is breathing): Full Support

## 2022-04-13 NOTE — CONSULTS
"Neurology    Referring provider:   No referring provider defined for this encounter.    Reason for Consultation: Trouble walking    Chief complaint: Leg weakness    History of present illness: 21-year-old woman seen for Dr. Fields for evaluation of intermittent leg weakness.    She been seeing a chiropractor for some minor leg symptoms and had neck and lower spine adjusted.  She had back pain starting 4 weeks ago noted progressive muscle spasms in her leg 2 weeks ago.    Neck pain was aggravated by movement.    She has some discomfort across her shoulders.  She has noted weakness or numbness in her arms.    Has had no recent infections other than a bladder infection.  No diarrhea or respiratory symptoms.    Has no involuntary movements or leg.    She does have some trouble voiding lying in bed.  But otherwise has no bowel or bladder issues.        Review of Systems: All systems reviewed and other than for hypothyroidism she is fine.        Home meds:   (Not in a hospital admission)      History  Past Medical History:   Diagnosis Date   • Hypertension    • Hypothyroidism    ,   Past Surgical History:   Procedure Laterality Date   • WISDOM TOOTH EXTRACTION     ,   Family History   Problem Relation Age of Onset   • No Known Problems Mother    • No Known Problems Father    • Diabetes Sister    • Hypertension Sister    • No Known Problems Brother    • Hypertension Maternal Grandmother    • No Known Problems Paternal Grandmother    • No Known Problems Paternal Grandfather    ,   Social History     Tobacco Use   • Smoking status: Never Smoker   • Smokeless tobacco: Never Used   Substance Use Topics   • Alcohol use: No   • Drug use: Never    and Allergies:  Penicillins,    Vital Signs   Blood pressure 122/68, pulse 94, temperature 98.6 °F (37 °C), temperature source Oral, resp. rate 16, height 154.9 cm (61\"), weight 68 kg (150 lb), last menstrual period 04/06/2022, SpO2 95 %.  Body mass index is 28.34 kg/m².    Physical " Exam:   General: Pleasant female in no distress              Head: No trauma              Neck: No bruits.  Supple.  She experiences some discomfort in her lower end of her lumbar spine when she flexes her neck.  Range of motion is noted.                Resp: Normal breath              Cor: Regular rhythm              Extremities: No edema              Skin: Warm and dry              Neuro: Mentally the patient is awake alert and oriented with normal memory attention and concentration.    Speech is articulate with no white word finding problems.    Cranial nerves show full eye movements with equal pupils.  She has no ptosis or weakness of eye closure.  Facial movement sensation are normal.    Palate elevates normally tongue protrudes normally.    Reflexes are 1+ and equal in the upper and lower extremities.    She has an equivocal upgoing toe on the right.    Coordination is normal on finger-nose testing bilaterally.    Heel-to-shin testing is done normally on the right.  It is wavers a bit on the left.    Motor testing shows her to be antigravity at the hip extensors and the knee extensors.  She is strong with ankle flexion and extension on the right.    She is 4/5 weakness of the hip extensors on the left.  She has antigravity however.    The can extend the knee on the left.    Sensory testing shows normal position sensation in the great toe bilaterally.    Vibratory sensation is normal in the lower extremities.    There is no sensory loss on the trunk.    Results Review: MRI of the brain was personally reviewed and was unremarkable.    MRI of the cervical spine shows a herniated disc at C 6-7.  No changes are noted in the spinal cord other than some mild portion.        Labs:  Lab Results (last 72 hours)     Procedure Component Value Units Date/Time    COVID PRE-OP / PRE-PROCEDURE SCREENING ORDER (NO ISOLATION) - Swab, Nasopharynx [[id number]] Collected: 04/13/22 1207    Specimen: Swab from Nasopharynx Updated:  04/13/22 1207    Narrative:      The following orders were created for panel order COVID PRE-OP / PRE-PROCEDURE SCREENING ORDER (NO ISOLATION) - Swab, Nasopharynx.  Procedure                               Abnormality         Status                     ---------                               -----------         ------                     COVID-19, ABBOTT IN-HOUS...[[id number]]                      In process                   Please view results for these tests on the individual orders.    COVID-19, ABBOTT IN-HOUSE,NASAL Swab (NO TRANSPORT MEDIA) 2 HR TAT - Swab, Nasopharynx [[id number]] Collected: 04/13/22 1207    Specimen: Swab from Nasopharynx Updated: 04/13/22 1207    Jackson Draw [[id number]] Collected: 04/13/22 0413    Specimen: Blood Updated: 04/13/22 0818    Narrative:      The following orders were created for panel order Jackson Draw.  Procedure                               Abnormality         Status                     ---------                               -----------         ------                     Green Top (Gel)[[id number]]                                  Final result               Lavender Top[[id number]]                                     Final result               Gold Top - SST[[id number]]                                   Final result               Colón Top[[id number]]                                         Final result               Light Blue Top[[id number]]                                   Final result                 Please view results for these tests on the individual orders.    Gray Top [[id number]] Collected: 04/13/22 0413    Specimen: Blood Updated: 04/13/22 0818     Extra Tube Hold for add-ons.     Comment: Auto resulted.       Blood Culture - Blood, Arm, Left [[id number]] Collected: 04/13/22 0525    Specimen: Blood from Arm, Left Updated: 04/13/22 0650    Blood Culture - Blood, Arm, Left [[id number]] Collected: 04/13/22 0515    Specimen: Blood from Arm, Left Updated: 04/13/22 0649     Comprehensive Metabolic Panel [[id number]]  (Abnormal) Collected: 04/13/22 0413    Specimen: Blood Updated: 04/13/22 0619     Glucose 104 mg/dL      BUN 15 mg/dL      Creatinine 0.75 mg/dL      Sodium 137 mmol/L      Potassium 4.0 mmol/L      Comment: Slight hemolysis detected by analyzer. Results may be affected.        Chloride 100 mmol/L      CO2 24.0 mmol/L      Calcium 9.1 mg/dL      Total Protein 7.7 g/dL      Albumin 4.40 g/dL      ALT (SGPT) 9 U/L      AST (SGOT) 17 U/L      Alkaline Phosphatase 74 U/L      Total Bilirubin 0.4 mg/dL      Globulin 3.3 gm/dL      Comment: Calculated Result        A/G Ratio 1.3 g/dL      BUN/Creatinine Ratio 20.0     Anion Gap 13.0 mmol/L      eGFR 115.6 mL/min/1.73      Comment: National Kidney Foundation and American Society of Nephrology (ASN) Task Force recommended calculation based on the Chronic Kidney Disease Epidemiology Collaboration (CKD-EPI) equation refit without adjustment for race.       Narrative:      GFR Normal >60  Chronic Kidney Disease <60  Kidney Failure <15      Lipase [[id number]]  (Normal) Collected: 04/13/22 0413    Specimen: Blood Updated: 04/13/22 0619     Lipase 25 U/L     C-reactive Protein [[id number]]  (Abnormal) Collected: 04/13/22 0413    Specimen: Blood Updated: 04/13/22 0619     C-Reactive Protein 6.21 mg/dL     Phosphorus [[id number]]  (Normal) Collected: 04/13/22 0413    Specimen: Blood Updated: 04/13/22 0619     Phosphorus 2.6 mg/dL     Magnesium [[id number]]  (Normal) Collected: 04/13/22 0413    Specimen: Blood Updated: 04/13/22 0619     Magnesium 1.8 mg/dL     Lactic Acid, Plasma [[id number]]  (Normal) Collected: 04/13/22 0413    Specimen: Blood Updated: 04/13/22 0616     Lactate 0.8 mmol/L      Comment: Falsely depressed results may occur on samples drawn from patients receiving N-Acetylcysteine (NAC) or Metamizole.       Procalcitonin [[id number]]  (Normal) Collected: 04/13/22 0413    Specimen: Blood Updated: 04/13/22 0611     Procalcitonin  "0.05 ng/mL     Narrative:      As a Marker for Sepsis (Non-Neonates):    1. <0.5 ng/mL represents a low risk of severe sepsis and/or septic shock.  2. >2 ng/mL represents a high risk of severe sepsis and/or septic shock.    As a Marker for Lower Respiratory Tract Infections that require antibiotic therapy:    PCT on Admission    Antibiotic Therapy       6-12 Hrs later    >0.5                Strongly Recommended  >0.25 - <0.5        Recommended   0.1 - 0.25          Discouraged              Remeasure/reassess PCT  <0.1                Strongly Discouraged     Remeasure/reassess PCT    As 28 day mortality risk marker: \"Change in Procalcitonin Result\" (>80% or <=80%) if Day 0 (or Day 1) and Day 4 values are available. Refer to http://www.[employer] [email or web address] [email or web address]    Change in PCT <=80%  A decrease of PCT levels below or equal to 80% defines a positive change in PCT test result representing a higher risk for 28-day all-cause mortality of patients diagnosed with severe sepsis for septic shock.    Change in PCT >80%  A decrease of PCT levels of more than 80% defines a negative change in PCT result representing a lower risk for 28-day all-cause mortality of patients diagnosed with severe sepsis or septic shock.       TSH [[id number]]  (Abnormal) Collected: 04/13/22 0413    Specimen: Blood Updated: 04/13/22 0604     TSH 29.640 uIU/mL     Narrative:      Due to abnormal TSH results, suggest ordering Free T4.    T4, Free [[id number]]  (Normal) Collected: 04/13/22 0413    Specimen: Blood Updated: 04/13/22 0604     Free T4 1.05 ng/dL     Narrative:      Results may be falsely increased if patient taking Biotin.      Lavender Top [[id number]] Collected: 04/13/22 0413    Specimen: Blood Updated: 04/13/22 0517     Extra Tube hold for add-on     Comment: Auto resulted       Light Blue Top [[id number]] Collected: 04/13/22 0413    Specimen: Blood Updated: 04/13/22 0517     Extra Tube hold for add-on     Comment: Auto resulted       Green " Top (Gel) [[id number]] Collected: 04/13/22 0413    Specimen: Blood Updated: 04/13/22 0517     Extra Tube Hold for add-ons.     Comment: Auto resulted.       Gold Top - SST [[id number]] Collected: 04/13/22 0413    Specimen: Blood Updated: 04/13/22 0517     Extra Tube Hold for add-ons.     Comment: Auto resulted.       CBC & Differential [[id number]]  (Abnormal) Collected: 04/13/22 0413    Specimen: Blood Updated: 04/13/22 0516    Narrative:      The following orders were created for panel order CBC & Differential.  Procedure                               Abnormality         Status                     ---------                               -----------         ------                     CBC Auto Differential[[id number]]        Abnormal            Final result                 Please view results for these tests on the individual orders.    CBC Auto Differential [[id number]]  (Abnormal) Collected: 04/13/22 0413    Specimen: Blood Updated: 04/13/22 0516     WBC 13.04 10*3/mm3      RBC 4.04 10*6/mm3      Hemoglobin 12.6 g/dL      Hematocrit 37.4 %      MCV 92.6 fL      MCH 31.2 pg      MCHC 33.7 g/dL      RDW 13.2 %      RDW-SD 44.5 fl      MPV 10.8 fL      Platelets 332 10*3/mm3      Neutrophil % 76.1 %      Lymphocyte % 14.3 %      Monocyte % 8.3 %      Eosinophil % 0.7 %      Basophil % 0.2 %      Immature Grans % 0.4 %      Neutrophils, Absolute 9.93 10*3/mm3      Lymphocytes, Absolute 1.86 10*3/mm3      Monocytes, Absolute 1.08 10*3/mm3      Eosinophils, Absolute 0.09 10*3/mm3      Basophils, Absolute 0.03 10*3/mm3      Immature Grans, Absolute 0.05 10*3/mm3      nRBC 0.0 /100 WBC           Rads:  Imaging Results (Last 72 Hours)     Procedure Component Value Units Date/Time    MRI Thoracic Spine Without Contrast [[id number]] Collected: 04/13/22 0853     Updated: 04/13/22 1157    Narrative:      DATE OF EXAM: 4/13/2022 8:34 AM     PROCEDURE: MRI THORACIC SPINE WO CONTRAST-, MRI LUMBAR SPINE WO  CONTRAST-     INDICATIONS:  severe back pain progressing to LE paralysis, loss of  reflexes on right, denies trauma; M54.50-Low back pain, unspecified;  M54.6-Pain in thoracic spine; G83.14-Monoplegia of lower limb affecting  left nondominant side; G83.11-Monoplegia of lower limb affecting right  dominant side     COMPARISON: No comparisons available.     TECHNIQUE: Routine magnetic resonance imaging of the thoracic spine was  performed without the administration of contrast.      FINDINGS:  MRI thoracic spine: The signal within the marrow visualized thoracic  vertebra does not appear unusual. The conus is around the level of T12.  No definite intrinsic cord abnormality is appreciated on this exam.  There is some atelectasis left basilar area.     It is noted that there is a disc osteophyte complex at C6-7 left of  central with some flattening of the anterior left aspect of the spinal  cord. No definite disc herniation or intervertebral foraminal stenosis  is apparent within the thoracic spine.     MRI lumbar spine: The conus is around the level of T12. The discs appear  well-hydrated. The central canal seems capacious.     T12-L1: No definite disc herniation or intervertebral foraminal  stenosis.     L1-L2: No definite disc herniation or intervertebral foraminal stenosis.     L2-L3: No definite disc herniation or intervertebral foraminal stenosis.     L3-L4: No definite disc herniation or intervertebral foraminal stenosis.     L4-L5: No definite disc herniation or intervertebral foraminal stenosis.     L5-S1: No definite disc herniation or intervertebral foraminal stenosis.       Impression:      1.  Disc osteophyte complex left of central C6-7.  2.  No significant abnormality within the thoracic spine.  3.  No significant abnormality within the lumbar spine.  4.  Suggested atelectasis left lower lobe in what is visualized of the  chest.     This report was finalized on 4/13/2022 9:01 AM by Ash Rosa MD.       MRI Lumbar Spine Without  Contrast [[id number]] Collected: 04/13/22 0853     Updated: 04/13/22 1157    Narrative:      DATE OF EXAM: 4/13/2022 8:34 AM     PROCEDURE: MRI THORACIC SPINE WO CONTRAST-, MRI LUMBAR SPINE WO  CONTRAST-     INDICATIONS: severe back pain progressing to LE paralysis, loss of  reflexes on right, denies trauma; M54.50-Low back pain, unspecified;  M54.6-Pain in thoracic spine; G83.14-Monoplegia of lower limb affecting  left nondominant side; G83.11-Monoplegia of lower limb affecting right  dominant side     COMPARISON: No comparisons available.     TECHNIQUE: Routine magnetic resonance imaging of the thoracic spine was  performed without the administration of contrast.      FINDINGS:  MRI thoracic spine: The signal within the marrow visualized thoracic  vertebra does not appear unusual. The conus is around the level of T12.  No definite intrinsic cord abnormality is appreciated on this exam.  There is some atelectasis left basilar area.     It is noted that there is a disc osteophyte complex at C6-7 left of  central with some flattening of the anterior left aspect of the spinal  cord. No definite disc herniation or intervertebral foraminal stenosis  is apparent within the thoracic spine.     MRI lumbar spine: The conus is around the level of T12. The discs appear  well-hydrated. The central canal seems capacious.     T12-L1: No definite disc herniation or intervertebral foraminal  stenosis.     L1-L2: No definite disc herniation or intervertebral foraminal stenosis.     L2-L3: No definite disc herniation or intervertebral foraminal stenosis.     L3-L4: No definite disc herniation or intervertebral foraminal stenosis.     L4-L5: No definite disc herniation or intervertebral foraminal stenosis.     L5-S1: No definite disc herniation or intervertebral foraminal stenosis.       Impression:      1.  Disc osteophyte complex left of central C6-7.  2.  No significant abnormality within the thoracic spine.  3.  No significant  abnormality within the lumbar spine.  4.  Suggested atelectasis left lower lobe in what is visualized of the  chest.     This report was finalized on 4/13/2022 9:01 AM by Ahs Rosa MD.       MRI Brain With & Without Contrast [[id number]] Collected: 04/13/22 1145     Updated: 04/13/22 1151    Narrative:      MRI BRAIN W WO CONTRAST-     Date of Exam: 4/13/2022 10:23 AM     Indication: 1 week of back pain with acute lower extremity paralysis;  M54.50-Low back pain, unspecified; M54.6-Pain in thoracic spine;  G83.14-Monoplegia of lower limb affecting left nondominant side;  G83.11-Monoplegia of lower limb affecting right dominant side.     Technique: Technique: Routine multiplanar multisequence MR imaging of  the brain was performed without and with the administration of 14 ml of  MultiHance  gadolinium contrast.        Comparison Exams: None available.     FINDINGS:  There are no areas of restricted diffusion.    There is no evidence of  intracranial hemorrhage.   There is no mass, mass effect, or  hydrocephalus. No abnormal extra-axial fluid collections are seen.    Major intracranial vascular flow voids are preserved.   Sella and  suprasellar cistern are within normal limits.   Cranio-vertebral  junction is normal.        No pathologic contrast enhancement.     Globes and orbits are within normal limits.      Visualized paranasal sinuses and mastoid air cells are clear.       Impression:         1. No acute intracranial abnormality.     2.  No pathologic intracranial contrast enhancement.     This report was finalized on 4/13/2022 11:48 AM by Jose L Hdz MD.       MRI Cervical Spine With & Without Contrast [[id number]] Collected: 04/13/22 1045     Updated: 04/13/22 1148    Narrative:      MRI CERVICAL SPINE WO CONTRAST-     Date of Exam: 4/13/2022 8:36 AM     Indication: 1 week of back pain with acute lower extremity paralysis;  M54.50-Low back pain, unspecified; M54.6-Pain in thoracic spine;  G83.14-Monoplegia  of lower limb affecting left nondominant side;  G83.11-Monoplegia of lower limb affecting right dominant side.     Comparison Exams: None available.     Technique: Routine multiplanar multisequence MR imaging of the cervical  spine was performed without the administration of contrast.     FINDINGS:  There is normal height, alignment, signal intensity of the cervical  vertebral bodies.  There is mild disc desiccation and disc space  narrowing at C5/6 and C6/7 levels.  There is normal signal within the  substance of the spinal cord.  Craniovertebral junction appears within  normal limits.     Postcontrast images demonstrate no pathologic contrast enhancement.     Axial images demonstrate:     C2/3:No significant disc bulge.  Facet joints are within normal limits.   There is no spinal canal stenosis or neural foraminal narrowing.     C3/4:No significant disc bulge.  Facet joints are within normal limits.   No spinal canal stenosis or neural foraminal narrowing     C4/5:No significant disc bulge.  Facet joints are within normal limits.   There is no spinal canal stenosis or neural foraminal narrowing.     C5/6:Mild posterior disc bulge.  No significant spinal canal stenosis.   Facet joints are within normal limits.  There is no neural foraminal  narrowing.     C6/7:There is abnormal increased T2 signal within the posterior annulus  consistent with an annular tear.  Moderate central disc protrusion  asymmetric to the left.  The disc protrusion effaces the anterior thecal  sac and contacts and deforms the ventral surface of the spinal cord  resulting in mild-to-moderate spinal canal stenosis.  Facet joints are  within normal limits.  There is minimal narrowing of the left neural  foramen.  No right neural foraminal narrowing.     C7/T1:No significant disc bulge.  No spinal canal stenosis or neural  foraminal narrowing.  Facet joints are within normal limits.       Impression:         1.  Moderate central disc protrusion  asymmetric to the left at the C6/7  level.  This is associated with an annular tear.  This results in mild  to moderate spinal canal stenosis and flattening of the ventral surface  of the spinal cord as detailed above.  There is also minimal narrowing  of the left neural foramen at this level.  2.  No pathologic contrast enhancement.     This report was finalized on 4/13/2022 11:45 AM by Jose L Hdz MD.       MRI Thoracic Spine With Contrast [[id number]] Resulted: 04/13/22 1101     Updated: 04/13/22 1102            Assessment: Herniated C6-7 cervical disc with intermittent leg weakness.           Plan:    Discussed the case with Dr. Fields and feel that surgery is indicated.    Comment:   The symptoms findings are more typical of intermittent cord compression then no Guillian Charlotte or transverse myelitis.  The latter 2 diagnoses usually do not wax and wane.  More importantly, the presence of deep tendon reflexes in the knee and ankle jerks would be out of keeping with Guillian Charlotte.    In addition the normal muscle tone would be unusual for a transverse myelitis.    I discussed the patients findings and my recommendations with patient, family, primary care team and consulting provider      Kyle Tracey MD  04/13/22  12:11 EDT

## 2022-04-13 NOTE — ANESTHESIA POSTPROCEDURE EVALUATION
Patient: Kiersten Mathews    Procedure Summary     Date: 04/13/22 Room / [facility]:  [facility]    Anesthesia Start: 1625 Anesthesia Stop: 1806    Procedure: CERVICAL DISCECTOMY ANTERIOR WITH FUSION C6-7 (Bilateral Spine Cervical) Diagnosis:       Paralysis of left lower extremity (HCC)      Paralysis of right lower extremity (HCC)      (Paralysis of left lower extremity (HCC) [G83.14])      (Paralysis of right lower extremity (HCC) [G83.11])    Surgeons: Andrea Fields MD Provider: Arnaud Baldwin MD    Anesthesia Type: general ASA Status: 2          Anesthesia Type: general    Vitals  Vitals Value Taken Time   /49 04/13/22 1655   Temp     Pulse 102 04/13/22 1802   Resp     SpO2 96 % 04/13/22 1802   Vitals shown include unvalidated device data.        Post Anesthesia Care and Evaluation    Patient location during evaluation: PACU  Patient participation: complete - patient participated  Level of consciousness: awake and alert  Pain management: adequate  Airway patency: patent  Anesthetic complications: No anesthetic complications  PONV Status: none  Cardiovascular status: hemodynamically stable and acceptable  Respiratory status: nonlabored ventilation, acceptable and nasal cannula  Hydration status: acceptable

## 2022-04-13 NOTE — ANESTHESIA PROCEDURE NOTES
Airway  Urgency: elective    Date/Time: 4/13/2022 4:31 PM  Airway not difficult    General Information and Staff    Patient location during procedure: OR  CRNA: Gerry Harry CRNA    Indications and Patient Condition  Indications for airway management: airway protection    Preoxygenated: yes  MILS not maintained throughout  Mask difficulty assessment: 1 - vent by mask    Final Airway Details  Final airway type: endotracheal airway      Successful airway: ETT  Cuffed: yes   Successful intubation technique: video laryngoscopy  Facilitating devices/methods: intubating stylet  Endotracheal tube insertion site: oral  Blade size: 2  ETT size (mm): 7.0  Cormack-Lehane Classification: grade I - full view of glottis  Placement verified by: chest auscultation and capnometry   Cuff volume (mL): 5  Measured from: lips  ETT/EBT  to lips (cm): 21  Number of attempts at approach: 1  Assessment: lips, teeth, and gum same as pre-op and atraumatic intubation    Additional Comments  Negative epigastric sounds, Breath sound equal bilaterally with symmetric chest rise and fall

## 2022-04-14 VITALS
HEIGHT: 61 IN | SYSTOLIC BLOOD PRESSURE: 118 MMHG | HEART RATE: 88 BPM | TEMPERATURE: 98.4 F | RESPIRATION RATE: 18 BRPM | DIASTOLIC BLOOD PRESSURE: 77 MMHG | WEIGHT: 150 LBS | OXYGEN SATURATION: 95 % | BODY MASS INDEX: 28.32 KG/M2

## 2022-04-14 PROBLEM — D72.829 LEUKOCYTOSIS: Status: RESOLVED | Noted: 2022-04-13 | Resolved: 2022-04-14

## 2022-04-14 PROBLEM — G83.11 PARALYSIS OF RIGHT LOWER EXTREMITY (HCC): Status: RESOLVED | Noted: 2022-04-13 | Resolved: 2022-04-14

## 2022-04-14 PROBLEM — M54.50 ACUTE LEFT-SIDED LOW BACK PAIN, UNSPECIFIED WHETHER SCIATICA PRESENT: Status: RESOLVED | Noted: 2022-04-13 | Resolved: 2022-04-14

## 2022-04-14 PROBLEM — G83.14 PARALYSIS OF LEFT LOWER EXTREMITY: Status: RESOLVED | Noted: 2022-04-13 | Resolved: 2022-04-14

## 2022-04-14 LAB
ANION GAP SERPL CALCULATED.3IONS-SCNC: 8 MMOL/L (ref 5–15)
BUN SERPL-MCNC: 11 MG/DL (ref 6–20)
BUN/CREAT SERPL: 17.2 (ref 7–25)
CALCIUM SPEC-SCNC: 9.1 MG/DL (ref 8.6–10.5)
CHLORIDE SERPL-SCNC: 104 MMOL/L (ref 98–107)
CO2 SERPL-SCNC: 27 MMOL/L (ref 22–29)
CREAT SERPL-MCNC: 0.64 MG/DL (ref 0.57–1)
DEPRECATED RDW RBC AUTO: 42.8 FL (ref 37–54)
EGFRCR SERPLBLD CKD-EPI 2021: 128.3 ML/MIN/1.73
ERYTHROCYTE [DISTWIDTH] IN BLOOD BY AUTOMATED COUNT: 12.9 % (ref 12.3–15.4)
GLUCOSE SERPL-MCNC: 103 MG/DL (ref 65–99)
HCT VFR BLD AUTO: 30.4 % (ref 34–46.6)
HGB BLD-MCNC: 10.3 G/DL (ref 12–15.9)
MAGNESIUM SERPL-MCNC: 2 MG/DL (ref 1.6–2.6)
MCH RBC QN AUTO: 30.8 PG (ref 26.6–33)
MCHC RBC AUTO-ENTMCNC: 33.9 G/DL (ref 31.5–35.7)
MCV RBC AUTO: 91 FL (ref 79–97)
PLATELET # BLD AUTO: 297 10*3/MM3 (ref 140–450)
PMV BLD AUTO: 10.6 FL (ref 6–12)
POTASSIUM SERPL-SCNC: 4.6 MMOL/L (ref 3.5–5.2)
RBC # BLD AUTO: 3.34 10*6/MM3 (ref 3.77–5.28)
SODIUM SERPL-SCNC: 139 MMOL/L (ref 136–145)
WBC NRBC COR # BLD: 14.84 10*3/MM3 (ref 3.4–10.8)

## 2022-04-14 PROCEDURE — 97165 OT EVAL LOW COMPLEX 30 MIN: CPT

## 2022-04-14 PROCEDURE — 97535 SELF CARE MNGMENT TRAINING: CPT

## 2022-04-14 PROCEDURE — 25010000002 MORPHINE PER 10 MG: Performed by: NEUROLOGICAL SURGERY

## 2022-04-14 PROCEDURE — 97161 PT EVAL LOW COMPLEX 20 MIN: CPT

## 2022-04-14 PROCEDURE — G0378 HOSPITAL OBSERVATION PER HR: HCPCS

## 2022-04-14 PROCEDURE — 99217 PR OBSERVATION CARE DISCHARGE MANAGEMENT: CPT | Performed by: FAMILY MEDICINE

## 2022-04-14 PROCEDURE — 63710000001 DIPHENHYDRAMINE PER 50 MG: Performed by: NEUROLOGICAL SURGERY

## 2022-04-14 PROCEDURE — 25010000002 CEFAZOLIN IN DEXTROSE 2-4 GM/100ML-% SOLUTION: Performed by: NEUROLOGICAL SURGERY

## 2022-04-14 PROCEDURE — 83735 ASSAY OF MAGNESIUM: CPT

## 2022-04-14 PROCEDURE — 97116 GAIT TRAINING THERAPY: CPT

## 2022-04-14 PROCEDURE — 99212 OFFICE O/P EST SF 10 MIN: CPT | Performed by: PSYCHIATRY & NEUROLOGY

## 2022-04-14 PROCEDURE — 85027 COMPLETE CBC AUTOMATED: CPT | Performed by: NEUROLOGICAL SURGERY

## 2022-04-14 PROCEDURE — 80048 BASIC METABOLIC PNL TOTAL CA: CPT | Performed by: NEUROLOGICAL SURGERY

## 2022-04-14 RX ORDER — DIAZEPAM 5 MG/1
5 TABLET ORAL EVERY 6 HOURS PRN
Qty: 24 TABLET | Refills: 0 | Status: SHIPPED | OUTPATIENT
Start: 2022-04-14 | End: 2022-04-20

## 2022-04-14 RX ORDER — OXYCODONE AND ACETAMINOPHEN 7.5; 325 MG/1; MG/1
1 TABLET ORAL EVERY 4 HOURS PRN
Qty: 24 TABLET | Refills: 0 | Status: SHIPPED | OUTPATIENT
Start: 2022-04-14 | End: 2022-04-21

## 2022-04-14 RX ADMIN — OXYCODONE HYDROCHLORIDE AND ACETAMINOPHEN 1 TABLET: 7.5; 325 TABLET ORAL at 12:17

## 2022-04-14 RX ADMIN — LEVOTHYROXINE SODIUM 75 MCG: 0.07 TABLET ORAL at 05:04

## 2022-04-14 RX ADMIN — CEFAZOLIN SODIUM 2 G: 2 INJECTION, SOLUTION INTRAVENOUS at 08:25

## 2022-04-14 RX ADMIN — CEFAZOLIN SODIUM 2 G: 2 INJECTION, SOLUTION INTRAVENOUS at 00:04

## 2022-04-14 RX ADMIN — MORPHINE SULFATE 2 MG: 2 INJECTION, SOLUTION INTRAMUSCULAR; INTRAVENOUS at 05:04

## 2022-04-14 RX ADMIN — DIPHENHYDRAMINE HYDROCHLORIDE 25 MG: 25 CAPSULE ORAL at 00:04

## 2022-04-14 RX ADMIN — OXYCODONE HYDROCHLORIDE AND ACETAMINOPHEN 1 TABLET: 7.5; 325 TABLET ORAL at 00:04

## 2022-04-14 RX ADMIN — SENNOSIDES AND DOCUSATE SODIUM 2 TABLET: 50; 8.6 TABLET ORAL at 08:25

## 2022-04-14 RX ADMIN — OXYCODONE HYDROCHLORIDE AND ACETAMINOPHEN 1 TABLET: 7.5; 325 TABLET ORAL at 08:25

## 2022-04-14 NOTE — PLAN OF CARE
Goal Outcome Evaluation:  Plan of Care Reviewed With: patient        Progress: improving  Outcome Evaluation: PT sheral completed. Patient pleasant w/ good effort. She presents w/ post-surgical weakness and functional decline from baseline. She completed transfers w/ CGA, ambulated 500ft w/ CGA and no AD, and negotiated 1 flight stairs w/ no LOB or instability. Reviewed spinal precautions and pt. verbalized understanding. Skilled IPPT indicated to promote return to PLOF. Recommend home w/ assist and OPPT.

## 2022-04-14 NOTE — PROGRESS NOTES
Neurology       Patient Care Team:  Karen Romano PA as PCP - General (Obstetrics and Gynecology)    Chief complaint: Cervical myelopathy    History: Patient had a successful surgery and is going home today.    The weakness is completely gone as is a tightness in her neck.      Past Medical History:   Diagnosis Date   • Hypertension    • Hypothyroidism        Vital Signs   Vitals:    04/14/22 1000 04/14/22 1117 04/14/22 1200 04/14/22 1307   BP:  118/77     BP Location:  Right arm     Patient Position:  Sitting     Pulse: 82 78 75 88   Resp:  18     Temp:  98.4 °F (36.9 °C)     TempSrc:  Oral     SpO2: 95%      Weight:       Height:           Physical Exam:   General: Awake and alert              Neuro: Oriented to person place and time.    Speech is normal.    D10 reflexes are 2+ and equal bilaterally.    Testing shows equal power in both legs.        Results Review:  Reviewed  Results from last 7 days   Lab Units 04/14/22  0534   WBC 10*3/mm3 14.84*   HEMOGLOBIN g/dL 10.3*   HEMATOCRIT % 30.4*   PLATELETS 10*3/mm3 297     Results from last 7 days   Lab Units 04/14/22  0534 04/13/22  0413 04/11/22  2312   SODIUM mmol/L 139 137 139   POTASSIUM mmol/L 4.6 4.0 4.0   CHLORIDE mmol/L 104 100 103   CO2 mmol/L 27.0 24.0 26.0   BUN mg/dL 11 15 25*   CREATININE mg/dL 0.64 0.75 0.87   CALCIUM mg/dL 9.1 9.1 9.5   BILIRUBIN mg/dL  --  0.4 0.2   ALK PHOS U/L  --  74 79   ALT (SGPT) U/L  --  9 9   AST (SGOT) U/L  --  17 16   GLUCOSE mg/dL 103* 104* 85       Imaging Results (Last 24 Hours)     Procedure Component Value Units Date/Time    FL C Arm During Surgery [[id number]] Collected: 04/13/22 1817     Updated: 04/14/22 1126    Narrative:      DATE OF EXAM: 4/13/2022 4:27 PM     PROCEDURE: FL C ARM DURING SURGERY-     INDICATIONS: ACDF; M54.50-Low back pain, unspecified; M54.6-Pain in  thoracic spine; 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     TECHNIQUE:     FINDINGS:   Dictation is to record six seconds of fluoroscopy time. There are two  associated images, the initial image showing metal instrument for  localization, anterior to the level of C7. Subsequent image shows  anterior and interbody fusion at C6-7. No significant abnormality of  alignment is seen. Please see the procedure report for any further  details.       Impression:      Fluoroscopy provided during anterior cervical disc fusion.     This report was finalized on 4/14/2022 11:23 AM by Dr. Kin Henriquez MD.             Assessment:  Postop herniated C6-7 disc with spinal cord torsion.    Plan:  Per Dr. Fields.  Outpatient follow-up in 3 weeks no work for 6 to 8 weeks.    Comment:  Good result         I discussed the patients findings and my recommendations with patient    Kyle Tracey MD  04/14/22  13:42 EDT

## 2022-04-14 NOTE — PLAN OF CARE
Goal Outcome Evaluation:  Plan of Care Reviewed With: patient        Progress: improving  Outcome Evaluation: OT eval complete. Pt is pleasent and cooperative. Educated on spinal/cervical precautions and how to maintain during ADLS and mobility. Pt completed log roll with CGA and sit to stand from EOB with CGA. Pt ambulates with CGA and no AE. Demonstrates ability to dof/don socks independently without use of AE by propping foot on opposite knee to maintain spinal/cervical preacutions. OT issued reacher for item retrieval and LH sponge as well. Pt educated on completion of desiree care while maintaiining spinal/cervical precautions. Recommend d/c home with assist from family and OP services.

## 2022-04-14 NOTE — CASE MANAGEMENT/SOCIAL WORK
Discharge Planning Assessment  [facility]     Patient Name: Kiersten Mathews  MRN: [id number]  Today's Date: 4/14/2022    Admit Date: 4/13/2022     Discharge Needs Assessment     Row Name 04/14/22 0956       Living Environment    People in Home parent(s)  Pt resides in [facility]    Name(s) of People in Home dad    Unique Family Situation pt's mother lives in [facility] and pt will return home with her at discharge    Primary Care Provided by self    Provides Primary Care For no one    Family Caregiver if Needed sibling(s);parent(s)    Family Caregiver Names mother Yulissa and sister Alesha    Quality of Family Relationships helpful;involved;supportive    Able to Return to Prior Arrangements yes       Resource/Environmental Concerns    Resource/Environmental Concerns none       Transition Planning    Patient/Family Anticipates Transition to home with family    Patient/Family Anticipated Services at Transition none    Transportation Anticipated family or friend will provide       Discharge Needs Assessment    Readmission Within the Last 30 Days no previous admission in last 30 days    Equipment Currently Used at Home none    Concerns to be Addressed denies needs/concerns at this time    Anticipated Changes Related to Illness none    Equipment Needed After Discharge none    Provided Post Acute Provider List? N/A    Provided Post Acute Provider Quality & Resource List? N/A               Discharge Plan     Row Name 04/14/22 0957       Plan    Plan home    Patient/Family in Agreement with Plan yes    Plan Comments CM spoke with pt at bedside. Pt resides in [facility] with her father however she is going to return home with her mother Yulissa at time of discharge. Pt reports she is independent of adls and denies use of DME at home. Pt was seeing a chiropractor prior to admission, reports she will no longer be following. Pt denies having a living will or legal POA. Pt has received her covid vaccinations.Pt confirms  she has [place] [employer] insurance and denies concerns or disruption in coverage. Pt has prescription drug coverage and denies issues obtaining or affording current medications. Pt reports she has ambulated around nursing unit and ambulated with therapy services up and down 10 stairs. Pt declined using assitive device and declines need for DME at home. Pt will have assistance from her mother at discharge and private transportation home. Pt has no needs at this time and CM will continue to follow.    Final Discharge Disposition Code 01 - home or self-care              Continued Care and Services - Admitted Since 4/13/2022    Coordination has not been started for this encounter.          Demographic Summary     Row Name 04/14/22 0954       General Information    Referral Source admission list    Reason for Consult discharge planning    Preferred Language English    General Information Comments PCP-Karen Romano       Contact Information    Permission Granted to Share Info With     Contact Information Comments [phone]               Functional Status     Row Name 04/14/22 0955       Functional Status    Usual Activity Tolerance excellent    Current Activity Tolerance moderate       Functional Status, IADL    Medications independent    Meal Preparation independent    Housekeeping independent    Laundry independent    Shopping independent       Mental Status    General Appearance WDL WDL       Mental Status Summary    Recent Changes in Mental Status/Cognitive Functioning no changes       Employment/    Employment/ Comments Pt confirms she has [place] [employer] insurance and denies concerns or disruption in coverage. Pt has prescription drug coverage and denies issues obtaining or affording current medications.               Psychosocial    No documentation.                Abuse/Neglect    No documentation.                Legal    No documentation.                Substance Abuse    No  documentation.                Patient Forms    No documentation.                   Heidy Pastrana RN

## 2022-04-14 NOTE — PLAN OF CARE
Goal Outcome Evaluation:  Plan of Care Reviewed With: patient        Progress: improving  Outcome Evaluation: VSS. RA. Pain controlled with PO pain medication. Ambulating. Worked with therapy. Dressing c/d/i. No complaints at this time

## 2022-04-14 NOTE — THERAPY EVALUATION
Patient Name: Kiersten Mathews  : 2000    MRN: [id number]                              Today's Date: 2022       Admit Date: 2022    Visit Dx:     ICD-10-CM ICD-9-CM   1. Acute left-sided low back pain, unspecified whether sciatica present  M54.50 724.2   2. Acute right-sided thoracic back pain  M54.6 724.1   3. Paralysis of left lower extremity (HCC)  G83.14 344.32   4. Paralysis of right lower extremity (HCC)  G83.11 344.31     Patient Active Problem List   Diagnosis   • Palpitations   • Precordial pain   • Acute left-sided low back pain, unspecified whether sciatica present   • Essential hypertension   • Hypothyroidism (acquired)   • Leukocytosis   • Paralysis of left lower extremity (HCC)   • Paralysis of right lower extremity (HCC)     Past Medical History:   Diagnosis Date   • Hypertension    • Hypothyroidism      Past Surgical History:   Procedure Laterality Date   • ANTERIOR CERVICAL DISCECTOMY W/ FUSION Bilateral 2022    Procedure: CERVICAL DISCECTOMY ANTERIOR WITH FUSION C6-7;  Surgeon: Andrea Fields MD;  Location: [facility];  Service: Neurosurgery;  Laterality: Bilateral;   • WISDOM TOOTH EXTRACTION        General Information     Row Name 22 0930          OT Time and Intention    Document Type evaluation  -HK     Mode of Treatment occupational therapy  -HK     Row Name 22          General Information    Patient Profile Reviewed yes  -HK     Prior Level of Function independent:;all household mobility;community mobility;gait;transfer;bed mobility;ADL's;driving;work  -HK     Existing Precautions/Restrictions spinal;other (see comments)  cervical precautions  -HK     Barriers to Rehab none identified  -HK     Row Name 22          Living Environment    People in Home parent(s)  reports she lives with her dad however can stay with her mom if needed.  -HK     Row Name 22 0930          Home Main Entrance    Number of Stairs, Main Entrance one  -HK      Stair Railings, Main Entrance none  -     Row Name 04/14/22 0930          Stairs Within Home, Primary    Number of Stairs, Within Home, Primary other (see comments)  15  -HK     Stair Railings, Within Home, Primary railing on right side (ascending)  -     Row Name 04/14/22 0930          Cognition    Orientation Status (Cognition) oriented x 4  -HK     Row Name 04/14/22 0930          Safety Issues, Functional Mobility    Safety Issues Affecting Function (Mobility) safety precaution awareness  -HK     Impairments Affecting Function (Mobility) pain;strength  -           User Key  (r) = Recorded By, (t) = Taken By, (c) = Cosigned By    Initials Name Provider Type     Lia Bentley, OT Occupational Therapist                 Mobility/ADL's     Row Name 04/14/22 0934          Bed Mobility    Bed Mobility rolling right;sidelying-sit  -HK     Rolling Right [place] (Bed Mobility) contact guard;verbal cues  -     Sidelying-Sit [place] (Bed Mobility) contact guard;verbal cues  -     Assistive Device (Bed Mobility) head of bed elevated  -     Comment, (Bed Mobility) Pt educated on log roll technique for completion of all bed mobility while maintaining spinal/cervical precautions.  -     Row Name 04/14/22 0934          Transfers    Transfers sit-stand transfer;toilet transfer  -     Comment, (Transfers) Pt educated on all spinal/cervical precautions and how to maintain during ADLS and mobility.  -     Sit-Stand [place] (Transfers) contact guard  -     [place] Level (Toilet Transfer) supervision  -     Assistive Device (Toilet Transfer) grab bars/safety frame  -     Row Name 04/14/22 0934          Sit-Stand Transfer    Assistive Device (Sit-Stand Transfers) other (see comments)  none  -     Row Name 04/14/22 0934          Functional Mobility    Functional Mobility- Ind. Level contact guard assist  -     Functional Mobility- Device --  none  -     Row Name 04/14/22 0934           Activities of Daily Living    BADL Assessment/Intervention lower body dressing;toileting;bathing  -     Row Name 04/14/22 0934          Lower Body Dressing Assessment/Training    [place] Level (Lower Body Dressing) doff;don;socks;independent  -HK     Position (Lower Body Dressing) edge of bed sitting  -HK     Comment, (Lower Body Dressing) Pt educated on completion of all LBD while maintaining spinal/cervical precautions. Pt able to dof/don socks independently by propping foot on opposite knee to maintain precautions. OT issued reacher for item retrieval.  -     Row Name 04/14/22 0934          Toileting Assessment/Training    Comment, (Toileting) Pt educated on safe completion of desiree care while maintaining cervical/spinal precautions.  -     Row Name 04/14/22 0934          Bathing Assessment/Intervention    [place] Level (Bathing) perineal area;independent  -HK     Position (Bathing) unsupported sitting  -HK     Comment, (Bathing) OT issued LH sponge and educated pt on use for completion of all LBB while maintaining spinal/cervical precautions. Pt compelted desiree care post urination with no assist from OT.  -           User Key  (r) = Recorded By, (t) = Taken By, (c) = Cosigned By    Initials Name Provider Type     Lia Bentley, OT Occupational Therapist               Obj/Interventions     Row Name 04/14/22 0942          Sensory Assessment (Somatosensory)    Sensory Assessment (Somatosensory) sensation intact  -     Sensory Assessment declines any B UE numbness and tingling.  -     Row Name 04/14/22 0942          Vision Assessment/Intervention    Visual Impairment/Limitations WFL  -     Row Name 04/14/22 0942          Range of Motion Comprehensive    General Range of Motion no range of motion deficits identified  -     Comment, General Range of Motion B UE WNL  -     Row Name 04/14/22 0942          Strength Comprehensive (MMT)    General Manual Muscle Testing (MMT) Assessment no  strength deficits identified  -HK     Comment, General Manual Muscle Testing (MMT) Assessment not formally asssesed due to spinal/cervical precautions.  -     Row Name 04/14/22 0942          Motor Skills    Motor Skills coordination  -     Coordination WNL  -     Row Name 04/14/22 0942          Balance    Balance Assessment sitting static balance;sitting dynamic balance;standing static balance;standing dynamic balance  -HK     Static Sitting Balance independent  -HK     Dynamic Sitting Balance independent  -HK     Position, Sitting Balance unsupported;sitting edge of bed  -HK     Static Standing Balance supervision  -HK     Dynamic Standing Balance supervision  -HK     Position/Device Used, Standing Balance unsupported  -HK     Balance Interventions sitting;occupation based/functional task  -HK           User Key  (r) = Recorded By, (t) = Taken By, (c) = Cosigned By    Initials Name Provider Type    HK Lia Bentley, OT Occupational Therapist               Goals/Plan     Row Name 04/14/22 1102          Bed Mobility Goal 1 (OT)    Activity/Assistive Device (Bed Mobility Goal 1, OT) rolling to left;rolling to right;sidelying to sit/sit to sidelying  -HK     [place] Level/Cues Needed (Bed Mobility Goal 1, OT) independent  -HK     Time Frame (Bed Mobility Goal 1, OT) by discharge;long term goal (LTG)  -HK     Progress/Outcomes (Bed Mobility Goal 1, OT) goal ongoing  -     Row Name 04/14/22 1102          Transfer Goal 1 (OT)    Activity/Assistive Device (Transfer Goal 1, OT) sit-to-stand/stand-to-sit;toilet  -HK     [place] Level/Cues Needed (Transfer Goal 1, OT) independent  -HK     Time Frame (Transfer Goal 1, OT) by discharge;long term goal (LTG)  -HK     Progress/Outcome (Transfer Goal 1, OT) goal ongoing  -     Row Name 04/14/22 1102          Dressing Goal 1 (OT)    Activity/Device (Dressing Goal 1, OT) lower body dressing  -HK     [place]/Cues Needed (Dressing Goal 1, OT) independent  -HK      Time Frame (Dressing Goal 1, OT) by discharge;long term goal (LTG)  -HK     Progress/Outcome (Dressing Goal 1, OT) goal met  -HK     Row Name 04/14/22 1102          Toileting Goal 1 (OT)    Activity/Device (Toileting Goal 1, OT) adjust/manage clothing;perform perineal hygiene  -HK     [place] Level/Cues Needed (Toileting Goal 1, OT) supervision required  -HK     Time Frame (Toileting Goal 1, OT) by discharge;long term goal (LTG)  -HK     Strategies/Barriers (Toileting Goal 1, OT) desiree care post bowel movement  -HK     Progress/Outcome (Toileting Goal 1, OT) goal ongoing  -HK     Row Name 04/14/22 1102          Therapy Assessment/Plan (OT)    Planned Therapy Interventions (OT) adaptive equipment training;functional balance retraining;occupation/activity based interventions;ROM/therapeutic exercise;transfer/mobility retraining;BADL retraining  -HK           User Key  (r) = Recorded By, (t) = Taken By, (c) = Cosigned By    Initials Name Provider Type    HK Lia Bentley, OT Occupational Therapist               Clinical Impression     Row Name 04/14/22 0957          Pain Assessment    Pretreatment Pain Rating 6/10  -HK     Posttreatment Pain Rating 6/10  -HK     Pain Location - Side/Orientation Bilateral  -HK     Pain Location anterior  -HK     Pain Location - neck  -HK     Pain Intervention(s) Ambulation/increased activity;Repositioned  -HK     Row Name 04/14/22 0973          Plan of Care Review    Plan of Care Reviewed With patient  -HK     Progress improving  -HK     Outcome Evaluation OT eval complete. Pt is pleasent and cooperative. Educated on spinal/cervical precautions and how to maintain during ADLS and mobility. Pt completed log roll with CGA and sit to stand from EOB with CGA. Pt ambulates with CGA and no AE. Demonstrates ability to dof/don socks independently without use of AE by propping foot on opposite knee to maintain spinal/cervical preacutions. OT issued reacher for item retrieval and LH sponge  as well. Pt educated on completion of desiree care while maintaiining spinal/cervical precautions. Recommend d/c home with assist from family and OP services.  -     Row Name 04/14/22 0957          Therapy Assessment/Plan (OT)    Patient/Family Therapy Goal Statement (OT) Pt would like to improve and return home.  -HK     Rehab Potential (OT) good, to achieve stated therapy goals  -HK     Criteria for Skilled Therapeutic Interventions Met (OT) yes;skilled treatment is necessary  -HK     Therapy Frequency (OT) daily  -           User Key  (r) = Recorded By, (t) = Taken By, (c) = Cosigned By    Initials Name Provider Type    HK Lia Bentley, OT Occupational Therapist               Outcome Measures     Row Name 04/14/22 1109          How much help from another is currently needed...    Putting on and taking off regular lower body clothing? 4  -HK     Bathing (including washing, rinsing, and drying) 3  -HK     Toileting (which includes using toilet bed pan or urinal) 3  -HK     Putting on and taking off regular upper body clothing 4  -HK     Taking care of personal grooming (such as brushing teeth) 4  -HK     Eating meals 4  -HK     AM-PAC 6 Clicks Score (OT) 22  -HK     Row Name 04/14/22 1000 04/14/22 0825       How much help from another person do you currently need...    Turning from your back to your side while in flat bed without using bedrails? 4  -AM 3  -AM    Moving from lying on back to sitting on the side of a flat bed without bedrails? 4  -AM 3  -AM    Moving to and from a bed to a chair (including a wheelchair)? 4  -AM 3  -AM    Standing up from a chair using your arms (e.g., wheelchair, bedside chair)? 4  -AM 3  -AM    Climbing 3-5 steps with a railing? 4  -AM 3  -AM    To walk in hospital room? 4  -AM 3  -AM    AM-PAC 6 Clicks Score (PT) 24  -AM 18  -AM    Row Name 04/14/22 1109          Functional Assessment    Outcome Measure Options AM-PAC 6 Clicks Daily Activity (OT)  -HK           User Key  (r) =  Recorded By, (t) = Taken By, (c) = Cosigned By    Initials Name Provider Type     Lia Bentley OT Occupational Therapist    Liliana Miranda RN Registered Nurse                Occupational Therapy Education                 Title: PT OT SLP Therapies (In Progress)     Topic: Occupational Therapy (In Progress)     Point: ADL training (Done)     Description:   Instruct learner(s) on proper safety adaptation and remediation techniques during self care or transfers.   Instruct in proper use of assistive devices.              Learning Progress Summary           Patient Acceptance, E,TB,D,H, VU by  at 4/14/2022 1113                   Point: Home exercise program (Not Started)     Description:   Instruct learner(s) on appropriate technique for monitoring, assisting and/or progressing therapeutic exercises/activities.              Learner Progress:  Not documented in this visit.          Point: Precautions (Done)     Description:   Instruct learner(s) on prescribed precautions during self-care and functional transfers.              Learning Progress Summary           Patient Acceptance, E,TB,D,H, VU by  at 4/14/2022 1113                   Point: Body mechanics (Done)     Description:   Instruct learner(s) on proper positioning and spine alignment during self-care, functional mobility activities and/or exercises.              Learning Progress Summary           Patient Acceptance, E,TB,D,H, VU by  at 4/14/2022 1113                               User Key     Initials Effective Dates Name Provider Type [facility] 06/16/21 -  Lia Bentley OT Occupational Therapist OT              OT Recommendation and Plan  Planned Therapy Interventions (OT): adaptive equipment training, functional balance retraining, occupation/activity based interventions, ROM/therapeutic exercise, transfer/mobility retraining, BADL retraining  Therapy Frequency (OT): daily  Plan of Care Review  Plan of Care Reviewed With: patient  Progress:  improving  Outcome Evaluation: OT eval complete. Pt is pleasent and cooperative. Educated on spinal/cervical precautions and how to maintain during ADLS and mobility. Pt completed log roll with CGA and sit to stand from EOB with CGA. Pt ambulates with CGA and no AE. Demonstrates ability to dof/don socks independently without use of AE by propping foot on opposite knee to maintain spinal/cervical preacutions. OT issued reacher for item retrieval and LH sponge as well. Pt educated on completion of desiree care while maintaiining spinal/cervical precautions. Recommend d/c home with assist from family and OP services.     Time Calculation:    Time Calculation- OT     Row Name 04/14/22 0830             Time Calculation- OT    OT Start Time 0830  -HK      OT Received On 04/14/22  -HK      OT Goal Re-Cert Due Date 04/24/22  -HK              Timed Charges    71671 - OT Self Care/Mgmt Minutes 10  -HK              Untimed Charges    OT Eval/Re-eval Minutes 45  -HK              Total Minutes    Timed Charges Total Minutes 10  -HK      Untimed Charges Total Minutes 45  -HK       Total Minutes 55  -HK            User Key  (r) = Recorded By, (t) = Taken By, (c) = Cosigned By    Initials Name Provider Type    HK Lia Bentley, OT Occupational Therapist              Therapy Charges for Today     Code Description Service Date Service Provider Modifiers Qty    [id number]  OT SELF CARE/MGMT/TRAIN EA 15 MIN 4/14/2022 Lia Bentley OT GO 1    [id number] HC OT EVAL LOW COMPLEXITY 3 4/14/2022 Lia Bentley OT GO 1               Lia Bentley OT  4/14/2022

## 2022-04-14 NOTE — THERAPY EVALUATION
Patient Name: Kiersten Mathews  : 2000    MRN: [id number]                              Today's Date: 2022       Admit Date: 2022    Visit Dx:     ICD-10-CM ICD-9-CM   1. Acute left-sided low back pain, unspecified whether sciatica present  M54.50 724.2   2. Acute right-sided thoracic back pain  M54.6 724.1   3. Paralysis of left lower extremity (HCC)  G83.14 344.32   4. Paralysis of right lower extremity (HCC)  G83.11 344.31     Patient Active Problem List   Diagnosis   • Palpitations   • Precordial pain   • Essential hypertension   • Hypothyroidism (acquired)     Past Medical History:   Diagnosis Date   • Hypertension    • Hypothyroidism      Past Surgical History:   Procedure Laterality Date   • ANTERIOR CERVICAL DISCECTOMY W/ FUSION Bilateral 2022    Procedure: CERVICAL DISCECTOMY ANTERIOR WITH FUSION C6-7;  Surgeon: Andrea Fields MD;  Location: [facility];  Service: Neurosurgery;  Laterality: Bilateral;   • WISDOM TOOTH EXTRACTION        General Information     Row Name 22 0835          Physical Therapy Time and Intention    Document Type evaluation  -MB     Mode of Treatment physical therapy  -MB     Row Name 22 0835          General Information    Patient Profile Reviewed yes  -MB     Prior Level of Function independent:;bed mobility;ADL's;transfer;gait;all household mobility;community mobility;driving;using stairs;work  -MB     Existing Precautions/Restrictions spinal  Cervical precautions  -MB     Barriers to Rehab none identified  -MB     Row Name 22 0835          Living Environment    People in Home parent(s)  -MB     Row Name 22 0835          Home Main Entrance    Number of Stairs, Main Entrance none  -MB     Stair Railings, Main Entrance none  -MB     Row Name 22 0835          Stairs Within Home, Primary    Stairs, Within Home, Primary Pt. was residing w/ her father PTA w/ upstairs bedroom. Pt. may D/C home to her mom's house w/ 1 MAXIMILIAN and no  stairs within the home.  -MB     Number of Stairs, Within Home, Primary other (see comments)  15  -MB     Stair Railings, Within Home, Primary railing on right side (ascending)  -MB     Row Name 04/14/22 0835          Cognition    Orientation Status (Cognition) oriented x 4  -MB     Row Name 04/14/22 0835          Safety Issues, Functional Mobility    Safety Issues Affecting Function (Mobility) safety precaution awareness  -MB     Impairments Affecting Function (Mobility) range of motion (ROM);strength;pain  -MB           User Key  (r) = Recorded By, (t) = Taken By, (c) = Cosigned By    Initials Name Provider Type    Mela Paige, PT Physical Therapist               Mobility     Row Name 04/14/22 0835          Bed Mobility    Comment, (Bed Mobility) Pt. received sitting EOB.  -MB     Row Name 04/14/22 0835          Transfers    Comment, (Transfers) Pt. demo. safe transfer technique, compliant with spinal precautions throughout.  -MB     Row Name 04/14/22 0835          Sit-Stand Transfer    Sit-Stand Pender (Transfers) contact guard  -MB     Row Name 04/14/22 0835          Gait/Stairs (Locomotion)    [place]ender Level (Gait) contact guard;verbal cues  -MB     Distance in Feet (Gait) 500  -MB     Deviations/Abnormal Patterns (Gait) shade decreased  -MB     [place] Level (Stairs) contact guard;verbal cues  -MB     Handrail Location (Stairs) right side (ascending)  -MB     Number of Steps (Stairs) 10  -MB     Ascending Technique (Stairs) step-to-step  -MB     Descending Technique (Stairs) step-to-step  -MB     Comment, (Gait/Stairs) Pt. ambulated w/ step through gait pattern w/ slower shade. She negotiated 10steps w/ R HR w/ VCs for step sequence. No LOB or instability.  -MB           User Key  (r) = Recorded By, (t) = Taken By, (c) = Cosigned By    Initials Name Provider Type    Mela Paige, PT Physical Therapist               Obj/Interventions     Row Name 04/14/22 0835           Range of Motion Comprehensive    Comment, General Range of Motion BLE ROM WFL. Reviewed spinal / cervical precautions.  -MB     Row Name 04/14/22 0835          Strength Comprehensive (MMT)    General Manual Muscle Testing (MMT) Assessment lower extremity strength deficits identified  -MB     Comment, General Manual Muscle Testing (MMT) Assessment Pt. able to move BLEs against gravity. Not formally tested d/t spinal precautions.  -MB     Row Name 04/14/22 0835          Motor Skills    Motor Skills coordination  -MB     Coordination bilateral;lower extremity;WNL  -[facility] Name 04/14/22 0835          Balance    Balance Assessment standing static balance;standing dynamic balance  -MB     Static Standing Balance supervision  -MB     Dynamic Standing Balance supervision  -MB     Position/Device Used, Standing Balance unsupported  -MB     Balance Interventions standing;sit to stand  -[facility] Name 04/14/22 0835          Sensory Assessment (Somatosensory)    Sensory Assessment (Somatosensory) LE sensation intact  -MB           User Key  (r) = Recorded By, (t) = Taken By, (c) = Cosigned By    Initials Name Provider Type    Mela Paige, PT Physical Therapist               Goals/Plan     Row Name 04/14/22 0835          Bed Mobility Goal 1 (PT)    Activity/Assistive Device (Bed Mobility Goal 1, PT) bed mobility activities, all  -MB     [place] Level/Cues Needed (Bed Mobility Goal 1, PT) independent  -MB     Time Frame (Bed Mobility Goal 1, PT) 1 week  -MB     Progress/Outcomes (Bed Mobility Goal 1, PT) goal ongoing  -MB     Row Name 04/14/22 0835          Transfer Goal 1 (PT)    Activity/Assistive Device (Transfer Goal 1, PT) transfers, all  -MB     [place] Level/Cues Needed (Transfer Goal 1, PT) independent  -MB     Time Frame (Transfer Goal 1, PT) 1 week  -MB     Progress/Outcome (Transfer Goal 1, PT) goal ongoing  -MB     Row Name 04/14/22 0835          Gait Training Goal 1 (PT)     Activity/Assistive Device (Gait Training Goal 1, PT) gait (walking locomotion)  -MB     [place] Level (Gait Training Goal 1, PT) independent  -MB     Distance (Gait Training Goal 1, PT) 500  -MB     Time Frame (Gait Training Goal 1, PT) 1 week  -MB     Progress/Outcome (Gait Training Goal 1, PT) goal ongoing  -MB     Row Name 04/14/22 0835          Stairs Goal 1 (PT)    Activity/Assistive Device (Stairs Goal 1, PT) stairs, all skills;using handrail, right  -MB     [place] Level/Cues Needed (Stairs Goal 1, PT) supervision required  -MB     Number of Stairs (Stairs Goal 1, PT) 15  -MB     Time Frame (Stairs Goal 1, PT) 10 days  -MB     Progress/Outcome (Stairs Goal 1, PT) goal ongoing  -MB     Row Name 04/14/22 0835          Patient Education Goal (PT)    Activity (Patient Education Goal, PT) HEP and spinal precautions  -MB     [place]/Cues/Accuracy (Memory Goal 2, PT) demonstrates adequately  -MB     Time Frame (Patient Education Goal, PT) 1 week  -MB     Progress/Outcome (Patient Education Goal, PT) goal ongoing  -MB     Row Name 04/14/22 0829          Therapy Assessment/Plan (PT)    Planned Therapy Interventions (PT) balance training;bed mobility training;gait training;home exercise program;stair training;strengthening;transfer training  -MB           User Key  (r) = Recorded By, (t) = Taken By, (c) = Cosigned By    Initials Name Provider Type    Mela Paige, PT Physical Therapist               Clinical Impression     Row Name 04/14/22 1124          Pain    Pretreatment Pain Rating 6/10  -MB     Posttreatment Pain Rating 6/10  -MB     Pain Location anterior  -MB     Pain Location - neck  -MB     Pain Intervention(s) Ambulation/increased activity;Repositioned  -MB     Row Name 04/14/22 1123          Plan of Care Review    Plan of Care Reviewed With patient  -MB     Progress improving  -MB     Outcome Evaluation PT eval completed. Patient pleasant w/ good effort. She presents w/  post-surgical weakness and functional decline from baseline. She completed transfers w/ CGA, ambulated 500ft w/ CGA and no AD, and negotiated 1 flight stairs w/ no LOB or instability. Reviewed spinal precautions and pt. verbalized understanding. Skilled IPPT indicated to promote return to PLOF. Recommend home w/ assist and OPPT.  -MB     Row Name 04/14/22 1127          Therapy Assessment/Plan (PT)    Patient/Family Therapy Goals Statement (PT) Return to home, work, PLOF.  -MB     Rehab Potential (PT) good, to achieve stated therapy goals  -MB     Criteria for Skilled Interventions Met (PT) yes;meets criteria;skilled treatment is necessary  -MB     Therapy Frequency (PT) daily  -MB     Row Name 04/14/22 1127          Vital Signs    Pre Systolic BP Rehab 104  -MB     Pre Treatment Diastolic BP 54  -MB     Post Systolic BP Rehab 108  -MB     Post Treatment Diastolic BP 81  -MB     Pre SpO2 (%) 96  -MB     O2 Delivery Pre Treatment room air  -MB     O2 Delivery Intra Treatment room air  -MB     Post SpO2 (%) 98  -MB     O2 Delivery Post Treatment room air  -MB     Pre Patient Position Sitting  -MB     Intra Patient Position Standing  -MB     Post Patient Position Sitting  -MB     Row Name 04/14/22 1127          Positioning and Restraints    Pre-Treatment Position in bed  -MB     Post Treatment Position chair  -MB     In Chair notified nsg;reclined;call light within reach;encouraged to call for assist;exit alarm on;waffle cushion;legs elevated  -MB           User Key  (r) = Recorded By, (t) = Taken By, (c) = Cosigned By    Initials Name Provider Type    Mela Paige, PT Physical Therapist               Outcome Measures     Row Name 04/14/22 1136 04/14/22 1000       How much help from another person do you currently need...    Turning from your back to your side while in flat bed without using bedrails? 4  -MB 4  -AM    Moving from lying on back to sitting on the side of a flat bed without bedrails? 3  -MB 4   -AM    Moving to and from a bed to a chair (including a wheelchair)? 4  -MB 4  -AM    Standing up from a chair using your arms (e.g., wheelchair, bedside chair)? 4  -MB 4  -AM    Climbing 3-5 steps with a railing? 3  -MB 4  -AM    To walk in hospital room? 3  -MB 4  -AM    AM-PAC 6 Clicks Score (PT) 21  -MB 24  -AM    Row Name 04/14/22 0825          How much help from another person do you currently need...    Turning from your back to your side while in flat bed without using bedrails? 3  -AM     Moving from lying on back to sitting on the side of a flat bed without bedrails? 3  -AM     Moving to and from a bed to a chair (including a wheelchair)? 3  -AM     Standing up from a chair using your arms (e.g., wheelchair, bedside chair)? 3  -AM     Climbing 3-5 steps with a railing? 3  -AM     To walk in hospital room? 3  -AM     AM-PAC 6 Clicks Score (PT) 18  -AM     Row Name 04/14/22 1136 04/14/22 1109       Functional Assessment    Outcome Measure Options AM-PAC 6 Clicks Basic Mobility (PT)  -MB AM-PAC 6 Clicks Daily Activity (OT)  -          User Key  (r) = Recorded By, (t) = Taken By, (c) = Cosigned By    Initials Name Provider Type    Mela Paige, PT Physical Therapist    Lia Baldwin, OT Occupational Therapist    Liliana Miranda RN Registered Nurse                             Physical Therapy Education                 Title: PT OT SLP Therapies (In Progress)     Topic: Physical Therapy (In Progress)     Point: Mobility training (Done)     Learning Progress Summary           Patient MIGUEL Cash D, VU by MB at 4/14/2022 1136                   Point: Home exercise program (Not Started)     Learner Progress:  Not documented in this visit.          Point: Body mechanics (Done)     Learning Progress Summary           Patient MIGUEL Cash D, VU by MB at 4/14/2022 1136                   Point: Precautions (Done)     Learning Progress Summary           Patient MIGUEL Cash D, VU by MB at 4/14/2022 1136                                User Key     Initials Effective Dates Name Provider Type Discipline    MB 06/16/21 -  Mela Casey, PT Physical Therapist PT              PT Recommendation and Plan  Planned Therapy Interventions (PT): balance training, bed mobility training, gait training, home exercise program, stair training, strengthening, transfer training  Plan of Care Reviewed With: patient  Progress: improving  Outcome Evaluation: PT eval completed. Patient pleasant w/ good effort. She presents w/ post-surgical weakness and functional decline from baseline. She completed transfers w/ CGA, ambulated 500ft w/ CGA and no AD, and negotiated 1 flight stairs w/ no LOB or instability. Reviewed spinal precautions and pt. verbalized understanding. Skilled IPPT indicated to promote return to PLOF. Recommend home w/ assist and OPPT.     Time Calculation:    PT Charges     Row Name 04/14/22 1137             Time Calculation    Start Time 0835  -MB      PT Received On 04/14/22  -MB      PT Goal Re-Cert Due Date 04/24/22  -MB              Time Calculation- PT    Total Timed Code Minutes- PT 15 minute(s)  -MB              Timed Charges    31161 - Gait Training Minutes  15  -MB              Untimed Charges    PT Eval/Re-eval Minutes 38  -MB              Total Minutes    Timed Charges Total Minutes 15  -MB      Untimed Charges Total Minutes 38  -MB       Total Minutes 53  -MB            User Key  (r) = Recorded By, (t) = Taken By, (c) = Cosigned By    Initials Name Provider Type    Mela Paige, PT Physical Therapist              Therapy Charges for Today     Code Description Service Date Service Provider Modifiers Qty    31608229229 HC GAIT TRAINING EA 15 MIN 4/14/2022 Mela Casey, PT GP 1    07392207171 HC PT EVAL LOW COMPLEXITY 3 4/14/2022 Mela Casey, PT GP 1          PT G-Codes  Outcome Measure Options: AM-PAC 6 Clicks Basic Mobility (PT)  AM-PAC 6 Clicks Score (PT): 21  AM-PAC 6 Clicks Score (OT):  22    Mela Casey, PT  4/14/2022

## 2022-04-15 ENCOUNTER — NURSE TRIAGE (OUTPATIENT)
Dept: CALL CENTER | Facility: HOSPITAL | Age: 22
End: 2022-04-15

## 2022-04-15 ENCOUNTER — READMISSION MANAGEMENT (OUTPATIENT)
Dept: CALL CENTER | Facility: HOSPITAL | Age: 22
End: 2022-04-15

## 2022-04-15 DIAGNOSIS — Z98.1 S/P CERVICAL SPINAL FUSION: Primary | ICD-10-CM

## 2022-04-15 RX ORDER — ONDANSETRON 4 MG/1
4 TABLET, FILM COATED ORAL EVERY 8 HOURS PRN
Qty: 40 TABLET | Refills: 0 | Status: CANCELLED | OUTPATIENT
Start: 2022-04-15

## 2022-04-15 RX ORDER — ONDANSETRON 4 MG/1
4 TABLET, FILM COATED ORAL EVERY 8 HOURS PRN
Qty: 15 TABLET | Refills: 0 | Status: SHIPPED | OUTPATIENT
Start: 2022-04-15

## 2022-04-15 NOTE — TELEPHONE ENCOUNTER
"Received message today from Shikha Herzog RN on the patient.     Message stated: \"Caller reports pt had ACD, was discharged yesterday, now having n/v. Reports one episode of emesis. Temp 97.8. No other c/o. Advice given per careadvice. Instructed to call nurseline back for any further issues or surgeon for any complications. Reviewed AVS to ensure had phone number for provider. Verbalized understanding\"    - I called and talked to the patient. Patient stated she started with the N/V last night, and had 2 separate episodes of this. Patient stated she is not having any issues swallowing, and she has kept the incision covered, iced. No fever is present.     Patient requested Zofran. Medication pended for approval.   "

## 2022-04-15 NOTE — TELEPHONE ENCOUNTER
Caller reports pt had ACD, was discharged yesterday, now having n/v. Reports one episode of emesis. Temp 97.8. No other c/o. Advice given per shruthi. Instructed to call nurseline back for any further issues or surgeon for any complications. Reviewed AVS to ensure had phone number for provider. Verbalized understanding  Reason for Disposition  • [1] Vomiting AND [2] present < 4 hours    Additional Information  • Negative: Sounds like a life-threatening emergency to the triager  • Negative: Chest pain  • Negative: Difficulty breathing  • Negative: Acting confused (e.g., disoriented, slurred speech) or excessively sleepy  • Negative: Surgical incision symptoms and questions  • Negative: [1] Discomfort (pain, burning or stinging) when passing urine AND [2] male  • Negative: [1] Discomfort (pain, burning or stinging) when passing urine AND [2] female  • Negative: Constipation  • Negative: New or worsening leg (calf, thigh) pain  • Negative: New or worsening leg swelling  • Negative: Dizziness is severe, or persists > 24 hours after surgery  • Negative: Pain, redness, swelling, or pus at IV Site  • Negative: Symptoms arising from use of a urinary catheter ([place] or [place])  • Negative: Cast problems or questions  • Negative: Medication question  • Negative: [1] Widespread rash AND [2] bright red, sunburn-like  • Negative: [1] SEVERE headache AND [2] after spinal (epidural) anesthesia  • Negative: [1] Vomiting AND [2] persists > 4 hours  • Negative: [1] Vomiting AND [2] abdomen looks much more swollen than usual  • Negative: [1] Drinking very little AND [2] dehydration suspected (e.g., no urine > 12 hours, very dry mouth, very lightheaded)  • Negative: Patient sounds very sick or weak to the triager  • Negative: Sounds like a serious complication to the triager  • Negative: Fever > 100.4 F (38.0 C)  • Negative: [1] SEVERE post-op pain (e.g., excruciating, pain scale 8-10) AND [2] not controlled with pain medications  •  "Negative: [1] Caller has URGENT question AND [2] triager unable to answer question  • Negative: [1] Headache AND [2] after spinal (epidural) anesthesia AND [3] not severe  • Negative: Fever present > 3 days (72 hours)  • Negative: [1] MILD-MODERATE post-op pain (e.g., pain scale 1-7) AND [2] not controlled with pain medications  • Negative: [1] Caller has NON-URGENT question AND [2] triager unable to answer question  • Negative: General activity, questions about  • Negative: Resuming driving, questions about  • Negative: Resuming sexual relations, questions about  • Negative: Getting the incision wet, questions about  • Negative: Throat pain after surgery, questions about    Answer Assessment - Initial Assessment Questions  1. SYMPTOM: \"What's the main symptom you're concerned about?\" (e.g., pain, fever, vomiting)      vomiting  2. ONSET: \"When did vomiting  start?\"      About 10 min ago  3. SURGERY: \"What surgery was performed?\"      ACD  4. DATE of SURGERY: \"When was surgery performed?\"       4/13  5. ANESTHESIA: \" What type of anesthesia did you have?\" (e.g., general, spinal, epidural, local)      Not sure  6. PAIN: \"Is there any pain?\" If Yes, ask: \"How bad is it?\"  (Scale 1-10; or mild, moderate, severe)      Did not report  7. FEVER: \"Do you have a fever?\" If Yes, ask: \"What is your temperature, how was it measured, and when did it start?\"      No, temp 97.8  8. VOMITING: \"Is there any vomiting?\" If yes, ask: \"How many times?\"      Yes, just once  9. BLEEDING: \"Is there any bleeding?\" If Yes, ask: \"How much?\" and \"Where?\"      Did not report  10. OTHER SYMPTOMS: \"Do you have any other symptoms?\" (e.g., drainage from wound, painful urination, constipation)        no    Protocols used: POST-OP SYMPTOMS AND QUESTIONS-ADULT-AH    "

## 2022-04-15 NOTE — OUTREACH NOTE
Prep Survey    Flowsheet Row Responses   [employer] facility patient discharged from? [place]   Is LACE score < 7 ? No   Emergency Room discharge w/ pulse ox? No   Eligibility Readm Mgmt   Discharge diagnosis Acute left-sided low back pain, unspecified whether sciatica present   Does the patient have one of the following disease processes/diagnoses(primary or secondary)? Other   Does the patient have Home health ordered? No   Is there a DME ordered? No   Comments regarding appointments Listed   Prep survey completed? Yes          MANOJ CASTREJON - Registered Nurse

## 2022-04-18 LAB
BACTERIA SPEC AEROBE CULT: NORMAL
BACTERIA SPEC AEROBE CULT: NORMAL

## 2022-04-19 ENCOUNTER — READMISSION MANAGEMENT (OUTPATIENT)
Dept: CALL CENTER | Facility: HOSPITAL | Age: 22
End: 2022-04-19

## 2022-04-19 NOTE — OUTREACH NOTE
Prep Survey    Flowsheet Row Responses   [employer] facility patient discharged from? [place]   Is LACE score < 7 ? No   Emergency Room discharge w/ pulse ox? No   Eligibility Readm Mgmt   Discharge diagnosis Acute left-sided low back pain, unspecified whether sciatica present   Does the patient have one of the following disease processes/diagnoses(primary or secondary)? General Surgery   Does the patient have Home health ordered? No   Is there a DME ordered? No   Prep survey completed? Yes          MANOJ CASTREJON - Registered Nurse

## 2022-04-19 NOTE — OUTREACH NOTE
General Surgery Week 1 Survey    Flowsheet Row Responses   [facility] facility patient discharged from? [place]   Does the patient have one of the following disease processes/diagnoses(primary or secondary)? Other   Week 1 attempt successful? No   Unsuccessful attempts Attempt 1          ANGELINA HENSON - Registered Nurse

## 2022-04-22 ENCOUNTER — READMISSION MANAGEMENT (OUTPATIENT)
Dept: CALL CENTER | Facility: HOSPITAL | Age: 22
End: 2022-04-22

## 2022-04-22 NOTE — OUTREACH NOTE
General Surgery Week 1 Survey    Flowsheet Row Responses   [facility] patient discharged from? [place]   Does the patient have one of the following disease processes/diagnoses(primary or secondary)? Other   Week 1 attempt successful? Yes   Call start time 1133   Call end time 1143   Is patient permission given to speak with other caregiver? Yes   List who call center can speak with mother   Medication alerts for this patient no changes.   Meds reviewed with patient/caregiver? Yes   Does the patient have a follow up appointment scheduled with their surgeon? Yes   Has the patient kept scheduled appointments due by today? N/A   Comments Pt to see surgeon on May 12, 2022.   Has home health visited the patient within 72 hours of discharge? N/A   Psychosocial issues? No   Did the patient receive a copy of their discharge instructions? Yes   Nursing interventions Reviewed instructions with patient   What is the patient's perception of their health status since discharge? Improving   Nursing interventions Nurse provided patient education   Is the patient /caregiver able to teach back basic post-op care? Drive as instructed by MD in discharge instructions, Keep incision areas clean,dry and protected, Lifting as instructed by MD in discharge instructions   Is the patient/caregiver able to teach back signs and symptoms of incisional infection? Increased redness, swelling or pain at the incisonal site, Increased drainage or bleeding, Incisional warmth, Pus or odor from incision, Fever   Is the patient/caregiver able to teach back steps to recovery at home? Set small, achievable goals for return to baseline health, Rest and rebuild strength, gradually increase activity, Make a list of questions for surgeon's appointment   Is the patient/caregiver able to teach back the hierarchy of who to call/visit for symptoms/problems? PCP, Specialist, Home health nurse, Urgent Care, ED, 911 Yes   Week 1 call completed? Yes   Wrap up  additional comments Pt reports improving slowly. Pt incision looking good. Pt recovering nicely. Restrictions in place for activity. Pt does live with Father.          JESUS ALMONTE - Registered Nurse

## 2022-04-30 NOTE — DISCHARGE SUMMARY
[facility] Medicine Services  DISCHARGE SUMMARY    Patient Name: Kiersten Mathews  : 2000  MRN: [id number]    Date of Admission: 2022  4:12 AM  Date of Discharge:  21  Primary Care Physician: Karen Romano PA    Consults     Date and Time Order Name Status Description    2022  9:21 AM Inpatient Neurology Consult General Completed           Hospital Course     Presenting Problem:   Acute left-sided low back pain, unspecified whether sciatica present [M54.50]    Active Hospital Problems    Diagnosis  POA   • Essential hypertension [I10]  Unknown   • Hypothyroidism (acquired) [E03.9]  Unknown      Resolved Hospital Problems    Diagnosis Date Resolved POA   • Acute left-sided low back pain, unspecified whether sciatica present [M54.50] 2022 Yes   • Leukocytosis [D72.829] 2022 Unknown   • 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 female with history of hypertension hypothyroid who presented with progressive back pain, back spasms and lower extremity weakness.  Symptoms have been occurring for 4 weeks. Found to have cervical spine disc herniation.      c6-7 disc herniation with cord rotation S/p  Discectomy   - Dr Donnie barboza with discharge, she will follow up with him   - MRI with osteophyte at C6-7   - Neurology was also consulted on admission      HTN   - Held home lisinopril due to hypotension      Hypothyroid  - Continue current synthroid dosing; repeat labs with PCP in 4-6 weeks       Discharge Follow Up Recommendations for outpatient labs/diagnostics:   Neurosurgery     Day of Discharge     HPI:   Pt reports pain at surgical site     Review of Systems  Gen- No fevers, chills  CV- No chest pain, palpitations  Resp- No cough, dyspnea  GI- No N/V/D, abd pain        Vital Signs:          Physical  Exam:  Constitutional: No acute distress, awake, alert  HENT: NCAT, mucous membranes moist  Respiratory: Clear to auscultation bilaterally, respiratory effort normal   Cardiovascular: RRR, no murmurs, rubs, or gallops  Gastrointestinal: Positive bowel sounds, soft, nontender, nondistended  Musculoskeletal: No bilateral ankle edema  Psychiatric: Appropriate affect, cooperative  Neurologic: Oriented x 3, speech clear  Skin: No rashes      Pertinent  and/or Most Recent Results     LAB RESULTS:                              Brief Urine Lab Results  (Last result in the past 365 days)      Color   Clarity   Blood   Leuk Est   Nitrite   Protein   CREAT   Urine HCG        04/13/22 1213               Negative           Microbiology Results (last 10 days)     ** No results found for the last 240 hours. **          No radiology results for the last 10 days            Results for orders placed during the hospital encounter of 06/30/21    Adult Transthoracic Echo Complete W/ Cont if Necessary Per Protocol    Interpretation Summary  · Left ventricular systolic function is normal. Estimated left ventricular EF = 55%.  · The cardiac valves are anatomically and functionally normal.      Plan for Follow-up of Pending Labs/Results:     Discharge Details        Discharge Medications      Continue These Medications      Instructions Start Date   levothyroxine 75 MCG tablet  Commonly known as: SYNTHROID, LEVOTHROID   75 mcg, Oral, Daily         Stop These Medications    lisinopril 10 MG tablet  Commonly known as: PRINIVIL,ZESTRIL     methocarbamol 750 MG tablet  Commonly known as: ROBAXIN     nitrofurantoin (macrocrystal-monohydrate) 100 MG capsule  Commonly known as: MACROBID        ASK your doctor about these medications      Instructions Start Date   diazePAM 5 MG tablet  Commonly known as: VALIUM  Ask about: Should I take this medication?   5 mg, Oral, Every 6 Hours PRN      oxyCODONE-acetaminophen 7.5-325 MG per tablet  Commonly  known as: PERCOCET  Ask about: Should I take this medication?   1 tablet, Oral, Every 4 Hours PRN      tiZANidine 2 MG tablet  Commonly known as: ZANAFLEX  Ask about: Should I take this medication?   2 mg, Oral, Every 6 Hours PRN             Allergies   Allergen Reactions   • Penicillins Hives         Discharge Disposition:  Home or Self Care    Diet:  Hospital:No active diet order      Activity:  Activity Instructions     Driving Restrictions      Type of Restriction: Driving    Driving Restrictions: No Driving Until Next Appointment          Restrictions or Other Recommendations:         CODE STATUS:    Code Status and Medical Interventions:   Ordered at: 04/13/22 2003     Level Of Support Discussed With:    Patient     Code Status (Patient has no pulse and is not breathing):    CPR (Attempt to Resuscitate)     Medical Interventions (Patient has pulse or is breathing):    Full Support       Future Appointments   Date Time Provider Department Center   5/12/2022  2:30 PM Deacon Shaikh PA-C [facility]       Additional Instructions for the Follow-ups that You Need to Schedule     Discharge Follow-up with PCP   As directed       Currently Documented PCP:    Karen Romano PA    PCP Phone Number:    [phone]     Follow Up Details: 2 weeks         Discharge Follow-up with Specified Provider: dr obrien; 1 Week   As directed      To: dr obrien    Follow Up: 1 Week                     Beverly Llamas DO  04/30/22      Time Spent on Discharge:  I spent  30  minutes on this discharge activity which included: face-to-face encounter with the patient, reviewing the data in the system, coordination of the care with the nursing staff as well as consultants, documentation, and entering orders.

## 2022-05-12 ENCOUNTER — OFFICE VISIT (OUTPATIENT)
Dept: NEUROSURGERY | Facility: CLINIC | Age: 22
End: 2022-05-12

## 2022-05-12 ENCOUNTER — HOSPITAL ENCOUNTER (OUTPATIENT)
Dept: GENERAL RADIOLOGY | Facility: HOSPITAL | Age: 22
Discharge: HOME | End: 2022-05-12
Admitting: PHYSICIAN ASSISTANT

## 2022-05-12 VITALS
DIASTOLIC BLOOD PRESSURE: 72 MMHG | HEIGHT: 61 IN | TEMPERATURE: 98 F | BODY MASS INDEX: 31.64 KG/M2 | SYSTOLIC BLOOD PRESSURE: 122 MMHG | WEIGHT: 167.6 LBS

## 2022-05-12 DIAGNOSIS — Z98.1 S/P CERVICAL SPINAL FUSION: ICD-10-CM

## 2022-05-12 DIAGNOSIS — Z98.1 S/P CERVICAL SPINAL FUSION: Primary | ICD-10-CM

## 2022-05-12 PROCEDURE — 72040 X-RAY EXAM NECK SPINE 2-3 VW: CPT

## 2022-05-12 PROCEDURE — 99024 POSTOP FOLLOW-UP VISIT: CPT | Performed by: PHYSICIAN ASSISTANT

## 2022-05-12 NOTE — PROGRESS NOTES
"Subjective   Patient ID: Kiersten Mathews is a 21 y.o. female is here today for follow-up for wound check.    HPI:      Patient is a nice 19-year-old female known to the neurosurgical practice for presenting with a left-sided C6-7 cervical disc with symptoms of paraparesis.  These were pretty quickly on setting so patient was taken emergently to the OR for a C6-7 ACDF.  Patient is back with complete resolution of all symptoms.  Her gait instability as well as the weakness throughout her body and urinary issues have all been totally rectified.  Patient has no pain and incision has been doing exceedingly well.    The following portions of the patient's history were reviewed and updated as appropriate: allergies, current medications, past family history, past medical history, past social history, past surgical history and problem list.    Review of Systems   All other systems reviewed and are negative.        Objective    reports that she has never smoked. She has never used smokeless tobacco. She reports that she does not drink alcohol and does not use drugs.   SMOKING STATUS: Non-smoker    Physical Exam:   Vitals:/72 (BP Location: Right arm, Patient Position: Sitting, Cuff Size: Adult)   Temp 98 °F (36.7 °C) (Temporal)   Ht 154.9 cm (61\")   Wt 76 kg (167 lb 9.6 oz)   BMI 31.67 kg/m²    BMI: Body mass index is 31.67 kg/m².     GENERAL:   The patient is in no acute distress, and is able to answer all questions appropriately.  Skin:  The incision is well-healed and well approximated.  No signs of infection, bleeding, or erythema.  Musculoskeletal:     strength is 5 out of 5 bilaterally.   Shoulder abduction is 5 out of 5.    Dorsiflexion is 5/5 Bilaterally  Plantarflexion is 5/5 bilaterally  Hip Flexion 5/5 bilaterally.    The patient´s gait is normal without antalgia.  Neurologic:   The patient is alert and oriented by 3.     Pupils are equal and reactive to light.    Visual fields are full.    Extraocular " movements are intact without nystagmus.    There is no evidence of central motor drift. No facial droop.  No difficulty with rapid alternating movements.    Sensation is equal bilaterally with no deficit.      Reflexes:  2+ @ biceps, triceps, brachioradialis, as well as the patellar and Achilles tendon bilaterally.  No sign of Tang's, clonus, or long track signs      Assessment & Plan   Independent Review of Radiographic Studies:    No new films reviewed at this visit  Medical Decision Making:    She is doing great.  We will check a reference x-ray and have a telephone visit with her in about 6 weeks to ensure that she is continue to do well.  She has not been taking any medicine recently.    Patient will go back to work with modified duty and discussed with us full return at the 6-week visit.    BMI is >= 25 and < 30. (Overweight) The following options were offered after discussion: weight loss educational material (shared in after visit summary)    Diagnoses and all orders for this visit:    1. S/P cervical spinal fusion (Primary)  -     XR Spine Cervical 2 or 3 View; Future      Return in about 6 weeks (around 6/23/2022).

## 2022-06-27 ENCOUNTER — OFFICE VISIT (OUTPATIENT)
Dept: NEUROSURGERY | Facility: CLINIC | Age: 22
End: 2022-06-27

## 2022-06-27 DIAGNOSIS — Z98.1 S/P CERVICAL SPINAL FUSION: Primary | ICD-10-CM

## 2022-06-27 PROCEDURE — 99024 POSTOP FOLLOW-UP VISIT: CPT | Performed by: PHYSICIAN ASSISTANT

## 2022-06-27 NOTE — PROGRESS NOTES
You have chosen to receive care through a telephone visit. Do you consent to use a telephone visit for your medical care today? Yes    15 minutes    Patient: Kiersten Mathews  : 2000    Primary Care Provider: Karen Romano PA      Chief Complaint: none    History of Present Illness:       Patient doing exceedingly well after her C6-7 ACDF.  Patient is back today for point of reference visit as a telephone visit.  Patient has x-rays that show good placement of hardware and screws.    Patient has had a full recovery from the progressive weakness that she was having prior to the surgery.    Patient is increased her activity and has been doing cardiovascular exercises and had a couple questions about progressing with weights on machines which I thought was okay.    Review of Systems   Constitutional: Negative for activity change, appetite change, chills, fatigue and fever.   HENT: Negative for congestion, dental problem, ear pain, hearing loss, sinus pressure and tinnitus.    Eyes: Negative for pain and redness.   Respiratory: Negative for apnea, cough, shortness of breath and wheezing.    Cardiovascular: Negative for chest pain, palpitations and leg swelling.   Gastrointestinal: Negative for abdominal distention, abdominal pain, blood in stool, constipation, diarrhea, nausea and vomiting.   Endocrine: Negative for cold intolerance, heat intolerance and polyuria.   Genitourinary: Negative for enuresis, frequency and urgency.   Skin: Negative for color change and rash.   Neurological: Negative for dizziness, tremors, seizures, syncope, speech difficulty, weakness, light-headedness, numbness and headaches.   Psychiatric/Behavioral: Negative for behavioral problems and confusion. The patient is not nervous/anxious.        Past Medical History:     Past Medical History:   Diagnosis Date   • Hypertension    • Hypothyroidism        Family History:     Family History   Problem Relation Age of Onset   • No Known  Problems Mother    • No Known Problems Father    • Diabetes Sister    • Hypertension Sister    • No Known Problems Brother    • Hypertension Maternal Grandmother    • No Known Problems Paternal Grandmother    • No Known Problems Paternal Grandfather        Social History:    reports that she has never smoked. She has never used smokeless tobacco. She reports that she does not drink alcohol and does not use drugs.   SMOKING STATUS: Non-smoker    Surgical History:     Past Surgical History:   Procedure Laterality Date   • ANTERIOR CERVICAL DISCECTOMY W/ FUSION Bilateral 4/13/2022    Procedure: CERVICAL DISCECTOMY ANTERIOR WITH FUSION C6-7;  Surgeon: Andrea Fields MD;  Location: [facility];  Service: Neurosurgery;  Laterality: Bilateral;   • WISDOM TOOTH EXTRACTION         Allergies:   Penicillins    Physical Exam:    Vital Signs:There were no vitals taken for this visit.   BMI: There is no height or weight on file to calculate BMI.     Exam limited secondary to telephone encounter    Medical Decision Making    Data Review:   X-rays of the cervical spine reviewed showing good placement of hardware at C6-7    Diagnosis:   Paraparesis   Status post C6-7 ACDF    Treatment Options:   Patient is doing exceedingly well and has had a full recovery with no weakness or pain after ACDF.    Patient will see us back on an as-needed basis    BMI is >= 30 and <35. (Class 1 Obesity). The following options were offered after discussion;: weight loss educational material (shared in after visit summary)     Diagnosis Plan   1. S/P cervical spinal fusion

## 2023-07-26 NOTE — CONSULTS
"NEUROSURGERY CONSULTATION    Referring Provider: ER provider  Patient Care Team:  Karen Romano PA as PCP - General (Obstetrics and Gynecology)    Chief Complaint: Weakness    History of Present Illness:  Is a very pleasant 21-year-old female with a history of progressive weakness really in the last 24 hours.  She presented with a history of neck and generalized achiness she noticed some gait instability and has been to multiple urgent treatment centers.  She was treated for urinary tract infections denies systemic illness recent infections cranial neuropathies or other symptomatology she is here for evaluation she reports weakness in her legs and some urinary symptoms her mother is present    Review of Systems:  Musculoskeletal and Neurological systems were reviewed and are negative except for:  Musculoskeletal: positive for muscle weakness.  Neurological: positive for difficulty walking.    History:  Past Medical History:   Diagnosis Date   • Hypertension    • Hypothyroidism    ,   Past Surgical History:   Procedure Laterality Date   • WISDOM TOOTH EXTRACTION     ,   Family History   Problem Relation Age of Onset   • No Known Problems Mother    • No Known Problems Father    • Diabetes Sister    • Hypertension Sister    • No Known Problems Brother    • Hypertension Maternal Grandmother    • No Known Problems Paternal Grandmother    • No Known Problems Paternal Grandfather    ,   Social History     Tobacco Use   • Smoking status: Never Smoker   • Smokeless tobacco: Never Used   Substance Use Topics   • Alcohol use: No   • Drug use: Never   , (Not in a hospital admission)   Allergies:  Penicillins      Physical Exam:  Vital Signs: Blood pressure 128/75, pulse 90, temperature 98.6 °F (37 °C), temperature source Oral, resp. rate 16, height 154.9 cm (61\"), weight 68 kg (150 lb), last menstrual period 04/06/2022, SpO2 95 %.  She is awake alert and follows commands    Pupils symmetric no Gwendolyn's    Extraocular " movement intact visual field full    Face is symmetric tongue is midline    Appears in no apparent distress    Neck is with reasonable range of motion nontender    She is strong in all her extremities with exception of her  strength may be a touch weaker.  She has no Tang's.  She has trace reflexes    Lower extremity shows 2-5 strength proximally if that she has 3-5 distally    She can dorsi and plantarflex she has more proximal than distal weakness    Sensation proprioception intact    She is briskly reflexive she may have an upgoing toe on the right    Data Review:  MRI cervical spine demonstrates C6-7 disc herniation I personally reviewed the findings with the radiologist there is rotation of the cord there is no evidence of signal change its not what I would consider that would produce this, weakness and that was explained to the patient and the mother    Thoracic and lumbar spines appear mostly unremarkable  CRP was elevated slight white count  Diagnosis:  1.  Paraparesis    2.  Central disc herniation C6-7 modest rotation of the cord no signal change-unusual story    Treatment Recommendations:  I discussed the case with neurology the mother and the patient.  From my standpoint it is possible that she sustained injury with chiropractic manipulation following an unstable disc.  It may be acute enough that there is no evidence of signal change in the cord and I did discuss the risk benefits expected outcome of anterior cervical discectomy and fusion at C6-7    Would like to get an MRI of the brain I discussed the case with Kartik Tracey of neurology who will evaluate her I did not exclude other possibilities such as MS tumors or other centrally acting issues but it certainly is unusual that she has bilateral weakness with some weakness in the hands and appears much more like a variant of spinal cord issue than she does any other pathology given her neck and generalized spastic pain    We will follow up on  her we will make arrangements for ACDF if the findings of disc herniation are her only because there is no evidence of strokes etc.    I spent 80 minutes in the care of the patient discussing plans of care etc. we will recommend admission to the hospitalist      Andrea Fields MD  04/13/22  09:36 EDT             Infliximab Counseling:  I discussed with the patient the risks of infliximab including but not limited to myelosuppression, immunosuppression, autoimmune hepatitis, demyelinating diseases, lymphoma, and serious infections.  The patient understands that monitoring is required including a PPD at baseline and must alert us or the primary physician if symptoms of infection or other concerning signs are noted.

## (undated) DEVICE — GLV SURG PREMIERPRO MIC LTX PF SZ8 BRN

## (undated) DEVICE — KITTNER SPONGE: Brand: DEROYAL

## (undated) DEVICE — PK NEURO DISC 10

## (undated) DEVICE — DISPOSABLE BIPOLAR FORCEPS 7 3/4" (19.7CM) SCOVILLE BAYONET, INSULATED, 1.5MM TIP AND 12 FT. (3.6M) CABLE: Brand: KIRWAN

## (undated) DEVICE — ANTIBACTERIAL UNDYED BRAIDED (POLYGLACTIN 910), SYNTHETIC ABSORBABLE SUTURE: Brand: COATED VICRYL

## (undated) DEVICE — GLV SURG BIOGEL LTX PF 8

## (undated) DEVICE — SUCTION CANISTER 2500CC: Brand: DEROYAL

## (undated) DEVICE — DRSNG WND BORDR/ADHS NONADHR/GZ LF 4X4IN STRL

## (undated) DEVICE — BLANKT WARM LOWR/BDY 100X120CM

## (undated) DEVICE — APPL CHLORAPREP TINTED 26ML TEAL

## (undated) DEVICE — TOOL MR8-14MH30 MR8 14CM MATCH 3MM: Brand: MIDAS REX MR8

## (undated) DEVICE — SPNG GZ WOVN 4X4IN 12PLY 10/BX STRL

## (undated) DEVICE — SYR CONTRL LUERLOK 10CC

## (undated) DEVICE — LIMB HOLDER, WRIST/ANKLE: Brand: DEROYAL

## (undated) DEVICE — SCRB SURG BACTOSHIELD CHG 4PCT 4OZ

## (undated) DEVICE — 3M™ STERI-DRAPE™ INSTRUMENT POUCH 1018: Brand: STERI-DRAPE™

## (undated) DEVICE — NDL HYPO ECLPS SFTY 25G 1 1/2IN

## (undated) DEVICE — ELECTRD BLD EZ CLN MOD XLNG 2.75IN

## (undated) DEVICE — SUT SILK 2/0 TIES 18IN A185H

## (undated) DEVICE — C-ARM: Brand: UNBRANDED

## (undated) DEVICE — 3M™ STERI-STRIP™ REINFORCED ADHESIVE SKIN CLOSURES, R1547, 1/2 IN X 4 IN (12 MM X 100 MM), 6 STRIPS/ENVELOPE: Brand: 3M™ STERI-STRIP™

## (undated) DEVICE — Device

## (undated) DEVICE — NEEDLE, QUINCKE 22GX3.5": Brand: MEDLINE INDUSTRIES, INC.

## (undated) DEVICE — ADHS LIQ MASTISOL 2/3ML

## (undated) DEVICE — DRP MICROSCOPE 4 BINOCULAR CV 54X150IN

## (undated) DEVICE — SOL ISO/ALC RUB 70PCT 4OZ

## (undated) DEVICE — GLOW 'N TELL 30CM TAPE (20 STRIPS): Brand: VASCUTAPE RADIOPAQUE TAPE

## (undated) DEVICE — STRAP POSTN KN/BDY FM 5X72IN DISP

## (undated) DEVICE — NEEDLE, QUINCKE, 20GX3.5": Brand: MEDLINE

## (undated) DEVICE — SURGICAL INSTRUMENTS COMPRESSION PIN 14MM
Type: IMPLANTABLE DEVICE | Site: SPINE CERVICAL | Status: NON-FUNCTIONAL
Removed: 2022-04-13

## (undated) DEVICE — PENCL ROCKRSWCH MEGADYNE W/HOLSTR 10FT SS

## (undated) DEVICE — BANDAGE,GAUZE,BULKEE II,4.5"X4.1YD,STRL: Brand: MEDLINE

## (undated) DEVICE — PATIENT RETURN ELECTRODE, SINGLE-USE, CONTACT QUALITY MONITORING, ADULT, WITH 9FT CORD, FOR PATIENTS WEIGING OVER 33LBS. (15KG): Brand: MEGADYNE

## (undated) DEVICE — SKYLINE ANTERIOR CERVICAL PLATE SYSTEM DRILL 2.2 X 12MM: Brand: SKYLINE